# Patient Record
Sex: MALE | Race: WHITE | NOT HISPANIC OR LATINO | Employment: OTHER | URBAN - NONMETROPOLITAN AREA
[De-identification: names, ages, dates, MRNs, and addresses within clinical notes are randomized per-mention and may not be internally consistent; named-entity substitution may affect disease eponyms.]

---

## 2018-04-19 ENCOUNTER — OFFICE VISIT CONVERTED (OUTPATIENT)
Dept: FAMILY MEDICINE CLINIC | Age: 83
End: 2018-04-19
Attending: FAMILY MEDICINE

## 2018-08-24 ENCOUNTER — OFFICE VISIT CONVERTED (OUTPATIENT)
Dept: FAMILY MEDICINE CLINIC | Age: 83
End: 2018-08-24
Attending: NURSE PRACTITIONER

## 2018-09-10 ENCOUNTER — OFFICE VISIT CONVERTED (OUTPATIENT)
Dept: UROLOGY | Facility: CLINIC | Age: 83
End: 2018-09-10
Attending: UROLOGY

## 2018-11-07 ENCOUNTER — OFFICE VISIT CONVERTED (OUTPATIENT)
Dept: FAMILY MEDICINE CLINIC | Age: 83
End: 2018-11-07
Attending: NURSE PRACTITIONER

## 2018-11-16 ENCOUNTER — OFFICE VISIT CONVERTED (OUTPATIENT)
Dept: FAMILY MEDICINE CLINIC | Age: 83
End: 2018-11-16
Attending: NURSE PRACTITIONER

## 2019-05-21 ENCOUNTER — OFFICE VISIT CONVERTED (OUTPATIENT)
Dept: FAMILY MEDICINE CLINIC | Age: 84
End: 2019-05-21
Attending: NURSE PRACTITIONER

## 2019-12-11 ENCOUNTER — OFFICE VISIT CONVERTED (OUTPATIENT)
Dept: FAMILY MEDICINE CLINIC | Age: 84
End: 2019-12-11
Attending: NURSE PRACTITIONER

## 2019-12-11 ENCOUNTER — HOSPITAL ENCOUNTER (OUTPATIENT)
Dept: OTHER | Facility: HOSPITAL | Age: 84
Discharge: HOME | End: 2019-12-11
Attending: NURSE PRACTITIONER

## 2019-12-11 LAB
CHOLEST SERPL-MCNC: 282 MG/DL (ref 107–200)
CHOLEST/HDLC SERPL: 4 {RATIO} (ref 3–6)
HDLC SERPL-MCNC: 70 MG/DL (ref 40–60)
LDLC SERPL CALC-MCNC: 178 MG/DL (ref 70–100)
PSA SERPL-MCNC: 6.73 NG/ML (ref 0–4)
TRIGL SERPL-MCNC: 168 MG/DL (ref 40–150)
VLDLC SERPL-MCNC: 34 MG/DL (ref 5–37)

## 2020-12-22 ENCOUNTER — HOSPITAL ENCOUNTER (OUTPATIENT)
Dept: OTHER | Facility: HOSPITAL | Age: 85
Discharge: HOME | End: 2020-12-22
Attending: NURSE PRACTITIONER

## 2020-12-22 ENCOUNTER — OFFICE VISIT CONVERTED (OUTPATIENT)
Dept: FAMILY MEDICINE CLINIC | Age: 85
End: 2020-12-22
Attending: NURSE PRACTITIONER

## 2020-12-22 LAB
CHOLEST SERPL-MCNC: 273 MG/DL (ref 107–200)
CHOLEST/HDLC SERPL: 4.9 {RATIO} (ref 3–6)
HDLC SERPL-MCNC: 56 MG/DL (ref 40–60)
LDLC SERPL CALC-MCNC: 187 MG/DL (ref 70–100)
T4 FREE SERPL-MCNC: 1.1 NG/DL (ref 0.9–1.8)
TRIGL SERPL-MCNC: 149 MG/DL (ref 40–150)
TSH SERPL-ACNC: 5.01 M[IU]/L (ref 0.27–4.2)
VLDLC SERPL-MCNC: 30 MG/DL (ref 5–37)

## 2021-05-16 VITALS — BODY MASS INDEX: 22.13 KG/M2 | HEIGHT: 67 IN | RESPIRATION RATE: 15 BRPM | WEIGHT: 141 LBS

## 2021-05-18 NOTE — PROGRESS NOTES
Eze Jauregui GHADA 1935     Office/Outpatient Visit    Visit Date: Fri, Aug 24, 2018 02:42 pm    Provider: Yenifer Shafer N.P. (Assistant: Sonya Paz MA)    Location: [facility]        Electronically signed by Yenifer Shafer N.P. on  2018 12:06:20 PM                             SUBJECTIVE:        CC:     Harvey is a 82 year old White male.  presents today due to urinary frequency         HPI:     Patient represents today for  reports of Urinary frequency , worsening over last couple weeks. Getting up on average 7x per night to void. Voiding small amounts, has weak urine stream and has some stopping and starting of urine problems. Currently being treated for Multiple Myeloma with Dr. Solano and normally sees Dr. Powers as his PCP. He denies any  fever or burning with urination, n/v/c or diarrhea.     ROS:     CONSTITUTIONAL:  Negative for chills, fatigue, fever and weight change.      CARDIOVASCULAR:  Negative for chest pain, orthopnea, paroxysmal nocturnal dyspnea and pedal edema.      RESPIRATORY:  Negative for dyspnea and cough.      GASTROINTESTINAL:  Negative for abdominal pain, heartburn, constipation, diarrhea, and stool changes.      GENITOURINARY:  Positive for frequency.   Negative for dysuria or hematuria.      PSYCHIATRIC:  Negative for anxiety and depression.          PM/FM/:     Last Reviewed on 2018 12:05 PM by Yenifer Shafer    Past Medical History:         Positive for    Primary Biliary Cirrhosis;         CURRENT MEDICAL PROVIDERS:    Oncologist: Star Solano             ADVANCED DIRECTIVES: None         Surgical History:     Procedures: colonoscopy , [id number]     Other Surgeries:    skin cancer remove right face;         Family History:     Father:  at age 69; Cause of death was Stroke     Mother:  at age 85; Cause of death was old age     Brother(s): 3 brother(s) total;  Coronary Artery Disease ( CABG ); Hypertension;  Depression      Sister(s): Healthy; 1 sister(s) total; # that are           Social History:     Occupation: Retired (Prior occupation: [employer])     Marital Status:      Children: 5 children         Tobacco/Alcohol/Supplements:     Last Reviewed on 2018 12:05 PM by Yenifer Shafer    Tobacco: He has never smoked.  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         Current Medications:     Last Reviewed on 2018 12:05 PM by Yenifer Shafer    Hydrochlorothiazide (HCTZ) 25mg Tablet Take 1 tablet(s) by mouth daily     Fenofibrate 145mg Tablet Take 1 tablet(s) by mouth daily     Ursodiol 250mg Tablet Take two pill every morning and one pill every evening     Levothyroxine Sodium 0.05mg Tablet 1 tab daily         OBJECTIVE:        Vitals:         Current: 2018 2:51:19 PM    Ht:  5 ft, 8 in;  Wt: 135.8 lbs;  BMI: 20.6    T: 97.7 F (oral);  BP: 124/60 mm Hg (left arm, sitting);  P: 71 bpm (left arm (BP Cuff), sitting);  sCr: 1.62 mg/dL;  GFR: 30.93         Exams:     PHYSICAL EXAM:     GENERAL: Vitals recorded well developed, well nourished;  well groomed;  no apparent distress;     RESPIRATORY: normal respiratory rate and pattern with no distress; normal breath sounds with no rales, rhonchi, wheezes or rubs;     CARDIOVASCULAR: normal rate; rhythm is regular;  normal S1; normal S2; no systolic murmur; no cyanosis; no edema;     GASTROINTESTINAL: nontender, nondistended; no hepatosplenomegaly or masses; no bruits;     GENITOURINARY: prostate: moderate hypertrophy; discreet nodule palpated in left lobe;     NEUROLOGICAL:  cranial nerves, motor and sensory function, reflexes, gait and coordination are all intact;     PSYCHIATRIC:  appropriate affect and demeanor; normal speech pattern; grossly normal memory;         Lab/Test Results: 8/21/18 labs from [facility]see labs, Creat 1.2, Bun 25, eGFR 58, BUN/Creat ratio 20.0/dmt             Glucose, Urine:  Neg (08/24/2018),     Bilirubin, urine:  Negative (08/24/2018),     Ketones, Urine Strip:  Negative (08/24/2018),     Specific Gravity, urine:  1.020 (08/24/2018),     Blood in Urine:  negative (08/24/2018),     pH, urine:  6.0 (08/24/2018),     Protein Urine QL:  negative (08/24/2018),     Urobilinogen, urine:  0.2 E.U./dL (08/24/2018),     Nitrite, Urine:  Negative (08/24/2018),     Leukoctyes, urine:  Negative (08/24/2018),     Appearance:  Clear (08/24/2018),     collection source:  Clean-catch (08/24/2018),     Color:  Yellow (08/24/2018),     Performed by::  AS (08/24/2018),             ASSESSMENT:           601.9   N41.0  Prostatitis              DDx:     V76.44   Z12.5  Screening for prostate cancer              DDx:         ORDERS:         Meds Prescribed:       Cipro (Ciprofloxacin HCl) 500mg Tablet Take 1 tablet(s) by mouth q12h for 14 days  #28 (Twenty Eight) tablet(s) Refills: 0         Lab Orders:       [id number]  Urinalysis, automated, without microscopy  (In-House)         *  [employer] Medicare screening PSA   (Send-Out)           Procedures Ordered:       REFER  Referral to Specialist or Other Facility  (Send-Out)                   PLAN:          Prostatitis Call if no improvement in 1 week to 10 days or worsening of symptoms.         REFERRALS:  Referral initiated to a urologist ( Dr. Shakeel Paz - [facility]; for evaluation of urinary frequency, enlarged prostate with small prostate nodule ).            Prescriptions:       Cipro (Ciprofloxacin HCl) 500mg Tablet Take 1 tablet(s) by mouth q12h for 14 days  #28 (Twenty Eight) tablet(s) Refills: 0           Orders:       [id number]  Urinalysis, automated, without microscopy  (In-House)         REFER  Referral to Specialist or Other Facility  (Send-Out)            Screening for prostate cancer     LABORATORY:  Labs ordered to be performed today include PSA Screening Medicare patients.            Orders:       *  [employer] Medicare screening PSA  (Send-Out)               CHARGE CAPTURE:           Primary Diagnosis:     601.9 Prostatitis            N41.0    Acute prostatitis              Orders:          [id number]   Office/outpatient visit; established patient, level 3  (In-House)             90021   Urinalysis, automated, without microscopy  (In-House)           V76.44 Screening for prostate cancer            Z12.5    Encounter for screening for malignant neoplasm of prostate        ADDENDUMS:      ____________________________________    Date: 08/27/2018 03:55 PM    Author: Idania Roque         Visit Note Faxed to:        User Entered Recipient; Number [phone]

## 2021-05-18 NOTE — PROGRESS NOTES
Eze JaureguiStaci 1935     Office/Outpatient Visit    Visit Date: Thu, Apr 19, 2018 12:34 pm    Provider: Karsten Powers MD (Assistant: Katy Clark RN)    Location: [facility]        Electronically signed by Karsten Powers MD on  04/22/2018 03:41:52 PM                             SUBJECTIVE:        CC:     Harvey is a 82 year old White male.  This is a follow-up visit.  9 month check up (PT IS NOT TAKING URSODIOL)         HPI:         Harvey presents with mixed hyperlipidemia.  Compliance with treatment has been good.  He specifically denies associated symptoms, including muscle pain and weakness.          Hypertension details; Harvey does not check his blood pressure other than at his clinic appointments.  He is tolerating the medication well without side effects.  Compliance with treatment has been good.      He tells me that he has been off of Ursodiol for 3 months.  Says that he was feeling so good that he just didn't see that need to take it.  I explained the preventive nature of the medication, but he really wants to hold off on the med.  He agrees that if the labs start to show an elevation of LFT's again he would get back on the medication at that point.     We reviewed the last note from Dr Solano and the labs related to his MGUS.  He is doing fine in that regard too.         With regard to the hypothyroidism, he denies any related symptoms.  He reports no symptoms suggestive of adverse medication effect.      ROS:     CONSTITUTIONAL:  Negative for chills, fatigue, fever and weight change.      CARDIOVASCULAR:  Negative for chest pain, orthopnea, paroxysmal nocturnal dyspnea and pedal edema.      RESPIRATORY:  Negative for dyspnea and cough.      GASTROINTESTINAL:  Negative for abdominal pain, heartburn, constipation, diarrhea, and stool changes.      GENITOURINARY:  Negative for dysuria and polyuria.      PSYCHIATRIC:  Negative for anxiety and depression.          PMH/FMH/SH:     Last  Reviewed on 2017 11:48 AM by Karsten Powers    Past Medical History:         Positive for    Primary Biliary Cirrhosis;         CURRENT MEDICAL PROVIDERS:    Oncologist: Star Solano             ADVANCED DIRECTIVES: None         Surgical History:     Procedures: colonoscopy , [id number]     Other Surgeries:    skin cancer remove right face;         Family History:     Father:  at age 69; Cause of death was Stroke     Mother:  at age 85; Cause of death was old age     Brother(s): 3 brother(s) total;  Coronary Artery Disease ( CABG ); Hypertension;  Depression     Sister(s): Healthy; 1 sister(s) total; # that are           Social History:     Occupation: Retired (Prior occupation: [employer])     Marital Status:      Children: 5 children         Tobacco/Alcohol/Supplements:     Last Reviewed on 2017 09:00 AM by Helen Hernandez    Tobacco: He has never smoked.  Non-drinker         Substance Abuse History:     Last Reviewed on 2017 11:48 AM by Karsten Powers        Mental Health History:     Last Reviewed on 2017 11:48 AM by Karsten Powers        Communicable Diseases (eg STDs):     Last Reviewed on 2017 11:48 AM by Karsten Powers            Allergies:     Last Reviewed on 2018 12:39 PM by Katy Clark      No Known Drug Allergies.         Current Medications:     Last Reviewed on 2018 12:40 PM by Katy Clark    Hydrochlorothiazide (HCTZ) 25mg Tablet Take 1 tablet(s) by mouth daily     Fenofibrate 145mg Tablet Take 1 tablet(s) by mouth daily     Ursodiol 250mg Tablet Take two pill every morning and one pill every evening     Levothyroxine Sodium 0.05mg Tablet 1 tab daily         OBJECTIVE:        Vitals:         Current: 2018 12:39:17 PM    Ht:  5 ft, 8 in;  Wt: 140 lbs;  BMI: 21.3    T: 97.5 F (oral);  BP: 157/70 mm Hg (left arm, sitting);  P: 71 bpm (left arm (BP Cuff), sitting);  sCr: 1.4 mg/dL;  GFR:  36.26        Repeat:     12:42:50 PM     BP:   132/78mm Hg (left arm, sitting)         Exams:     PHYSICAL EXAM:     GENERAL:  well developed and nourished; appropriately groomed; in no apparent distress;     EYES: Nonicteric and with unremarkable lids, iris and pupils;     E/N/T: EARS:  normal external auditory canals and tympanic membranes;  grossly normal hearing; OROPHARYNX: several missing teeth;     NECK: carotid exam reveals no bruits;     RESPIRATORY: normal respiratory rate and pattern with no distress; normal breath sounds with no rales, rhonchi, wheezes or rubs;     CARDIOVASCULAR: normal rate; rhythm is regular;  no systolic murmur;     LYMPHATIC: no enlargement of cervical or facial nodes;     SKIN: The  site on the dorsal aspect of  hand is resolved now, (see photo);     MUSCULOSKELETAL: spine: lumbar lateral curvature with convexity to the right;  No pedal edema.;     NEUROLOGIC: No lateralizing deficit.;     PSYCHIATRIC:  appropriate affect and demeanor; normal speech pattern; grossly normal memory;         ASSESSMENT           272.2   E78.2  Mixed hyperlipidemia              DDx:     401.1   I10  Hypertension              DDx:     571.6   K74.3  Biliary cirrhosis              DDx:     273.1   D47.2  MGUS              DDx:     244.9   E03.9  Hypothyroidism              DDx:         ORDERS:         Meds Prescribed:       Refill of: Hydrochlorothiazide (HCTZ) 25mg Tablet Take 1 tablet(s) by mouth daily  #90 (Ninety) tablet(s) Refills: 0         Lab Orders:       [id number]  HTN - [facility] CMP AND LIPID: [id number], [id number]  (Send-Out)         66147  TSH - [facility] TSH  (Send-Out)                   PLAN:          Mixed hyperlipidemia     LABORATORY:  Labs ordered to be performed today include HTN/Lipid Panel: CMP, Lipid.            Orders:       [id number]  HTN - [facility] CMP AND LIPID: [id number], [id number]  (Send-Out)            Hypertension           Prescriptions:       Refill of: Hydrochlorothiazide (HCTZ) 25mg Tablet Take 1 tablet(s)  by mouth daily  #90 (Ninety) tablet(s) Refills: 0          Biliary cirrhosis will see what the labs look like with him off of the medication.          MGUS continue to follow with Dr Solano          Hypothyroidism     LABORATORY:  Labs ordered to be performed today include TSH.            Orders:       [id number]  TSH - [facility] TSH  (Send-Out)               CHARGE CAPTURE           **Please note: ICD descriptions below are intended for billing purposes only and may not represent clinical diagnoses**        Primary Diagnosis:         272.2 Mixed hyperlipidemia            E78.2    Mixed hyperlipidemia              Orders:          [id number]   Office/outpatient visit; established patient, level 4  (In-House)           401.1 Hypertension            I10    Essential (primary) hypertension    571.6 Biliary cirrhosis            K74.3    Primary biliary cirrhosis    273.1 MGUS            D47.2    Monoclonal gammopathy    244.9 Hypothyroidism            E03.9    Hypothyroidism, unspecified

## 2021-05-18 NOTE — PROGRESS NOTES
Juventino Eze GHADA 1935     Office/Outpatient Visit    Visit Date:  03:46 pm    Provider: Yenifer Shafer N.P. (Assistant: Bashir Hummel)    Location: [facility]        Electronically signed by Yenifer Shafer N.P. on  2018 06:08:11 PM                             SUBJECTIVE:        CC: (not taking the ursidiol)     Harvey is a 83 year old White male.  This is a follow-up visit.  anxiety (wants flu shot today)         HPI:         PHQ-9 Depression Screening: Completed form scanned and in chart; Total Score 4 Alcohol Consumption Screening: Completed form scanned and in chart; Total Score 0         Concerning depression with anxiety, this is a routine follow-up.  The diagnosis of depression was made 3 months ago.  Presently, he feels a mild degree of depression.  Current medications include Lexapro.  Currently, he is doing well without any significant affective symptoms.  Presently, Harvey denies suicidal ideation.  Medical history that may be a contributing factor to depression includes CKD, MM.      ROS:     CONSTITUTIONAL:  Negative for chills, fatigue, fever and weight change.      CARDIOVASCULAR:  Negative for chest pain, orthopnea, paroxysmal nocturnal dyspnea and pedal edema.      RESPIRATORY:  Negative for dyspnea and cough.      GASTROINTESTINAL:  Negative for abdominal pain, heartburn, constipation, diarrhea, and stool changes.      PSYCHIATRIC:  Positive for anxiety and depression ( (improved with Lexapro) ).          PM/[facility]/:     Last Reviewed on 2018 04:11 PM by Yenifer Shafer    Past Medical History:         Positive for    Primary Biliary Cirrhosis;         CURRENT MEDICAL PROVIDERS:    Oncologist: Star Solano             ADVANCED DIRECTIVES: None         Surgical History:     Procedures: colonoscopy , [id number]     Other Surgeries:    skin cancer remove right face;         Family History:     Father:  at age 69; Cause of death was Stroke      Mother:  at age 85; Cause of death was old age     Brother(s): 3 brother(s) total;  Coronary Artery Disease ( CABG ); Hypertension;  Depression     Sister(s): Healthy; 1 sister(s) total; # that are           Social History:     Occupation: Retired (Prior occupation: [employer])     Marital Status:      Children: 5 children         Tobacco/Alcohol/Supplements:     Last Reviewed on 2018 04:11 PM by Yenifer Shafer    Tobacco: He has never smoked.  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         Current Medications:     Last Reviewed on 2018 04:11 PM by Yenifer Shafer    Hydrochlorothiazide (HCTZ) 25mg Tablet Take 1 tablet(s) by mouth daily     Fenofibrate 145mg Tablet Take 1 tablet(s) by mouth daily     Ursodiol 250mg Tablet Take two pill every morning and one pill every evening     Levothyroxine Sodium 0.05mg Tablet 1 tab daily     Dexamethasone 4mg Tablet Take  1 tablet(s) by mouth qam     Acyclovir 400mg Tablet 5 pills tues and wed morning         OBJECTIVE:        Vitals:         Current: 11/7/2018 3:56:26 PM    Ht:  5 ft, 8 in;  Wt: 134.2 lbs;  BMI: 20.4    T: 97.3 F (oral);  BP: 140/66 mm Hg (right arm, sitting);  P: 66 bpm (right arm (BP Cuff), sitting);  sCr: 1.62 mg/dL;  GFR: 30.28        Exams:     PHYSICAL EXAM:     GENERAL: Vitals recorded well developed, well nourished;  well groomed;  no apparent distress;     RESPIRATORY: normal respiratory rate and pattern with no distress; normal breath sounds with no rales, rhonchi, wheezes or rubs;     CARDIOVASCULAR: normal rate; rhythm is regular;  normal S1; normal S2; no systolic murmur; no cyanosis; no edema;     GASTROINTESTINAL: nontender, nondistended; no hepatosplenomegaly or masses; no bruits;     NEUROLOGIC: GROSSLY INTACT     PSYCHIATRIC:  appropriate affect and demeanor; normal speech pattern; grossly normal memory;         Procedures:     Vaccination against other viral diseases, Influenza     1. Influenza high dose 0.5 ml unit dose, ABN signed given IM in the left upper arm; administered by  Regarding contraindications to an Influenza vaccine: Denies moderate/severe illness with/without fever; serious reaction to eggs, egg proteins, gentamicin, gelatin, arginine, neomycin or polymixin; serious reaction after recieving previous influenza vaccines; and history of Guillain-[place] Syndrome.              ASSESSMENT:           V04.81   Z23  Vaccination against other viral diseases, Influenza              DDx:     V79.0   Z13.89  Screening for depression              DDx:     300.4   F34.9  Depression with anxiety              DDx:         ORDERS:         Meds Prescribed:       Refill of: Lexapro (Escitalopram Oxalate) 5mg Tablet 1 tablet daily at bedtime  #90 (Ninety) tablet(s) Refills: 1         Lab Orders:       APPTO  Appointment need  (In-House)           Procedures Ordered:       [id number]  Fluzone High Dose   (In-House)           Other Orders:         Medicare Flu Administration  (In-House)                   PLAN:          Vaccination against other viral diseases, Influenza           Orders:       [id number]  Fluzone High Dose  (In-House)           Medicare Flu Administration  (In-House)            Screening for depression     MIPS PHQ-9 Depression Screening: Completed form scanned and in chart; Total Score 4          Depression with anxiety         FOLLOW-UP: Schedule a follow-up visit in 4 months..            Prescriptions:       Refill of: Lexapro (Escitalopram Oxalate) 5mg Tablet 1 tablet daily at bedtime  #90 (Ninety) tablet(s) Refills: 1           Orders:       APPTO  Appointment need  (In-House)               Patient Recommendations:        For  Depression with anxiety:     Schedule a follow-up visit in 4 months.                APPOINTMENT INFORMATION:        Monday Tuesday Wednesday Thursday Friday Saturday Sunday            Time:___________________AM  PM   Date:_____________________             CHARGE CAPTURE:           Primary Diagnosis:     V04.81 Vaccination against other viral diseases, Influenza            Z23    Encounter for immunization              Orders:          [id number]   Office/outpatient visit; established patient, level 4  (In-House)             91188   Fluzone High Dose  (In-House)                Medicare Flu Administration  (In-House)           V79.0 Screening for depression            Z13.89    Encounter for screening for other disorder    300.4 Depression with anxiety            F34.9    Persistent mood [affective] disorder, unspecified              Orders:          APPTO   Appointment need  (In-House)

## 2021-05-18 NOTE — PROGRESS NOTES
Eze JaureguiStaci 1935     Office/Outpatient Visit    Visit Date: Tue, May 21, 2019 09:03 am    Provider: Yenifer Shafer N.P. (Assistant: Sonya Paz MA)    Location: [facility]        Electronically signed by Yenifer Shafer N.P. on  05/21/2019 12:52:15 PM                             SUBJECTIVE:        CC:     Harvey is a 83 year old White male.  This is a follow-up visit.  med refill         HPI:         Patient presents with hypothyroidism.  This was first diagnosed about 4 yrs ago, was started on LT4 by Dr. Solano years ago.  He is currently taking Levothyroid, 50 mcg daily.  TSH was last checked one month ago.  The result was reported as normal.  He denies any related symptoms.  He reports no symptoms suggestive of adverse medication effect.  Had labs done at DR. Major office, will obtain copy     ROS:     CONSTITUTIONAL:  Negative for chills, fatigue and fever.      CARDIOVASCULAR:  Positive for takes BP and Chol med as directed..   Negative for chest pain, orthopnea, paroxysmal nocturnal dyspnea or pedal edema.      RESPIRATORY:  Negative for dyspnea and cough.      GASTROINTESTINAL:  Negative for abdominal pain, heartburn, constipation, diarrhea, and stool changes.      ENDOCRINE:  Positive for Takes Thyroid medication as directed.      PSYCHIATRIC:  Positive for anxiety ( (taking Lexapro with improvement at 5mg but still having symptoms of depression, dealing with wife who has Alz) ), depression and feelings of stress.   Negative for suicidal thoughts.          PMH/FM/SH:     Last Reviewed on 5/21/2019 09:25 AM by Yenifer Shafer    Past Medical History:         Positive for    Primary Biliary Cirrhosis;         CURRENT MEDICAL PROVIDERS:    Oncologist: Star Solano             ADVANCED DIRECTIVES: None         PREVENTIVE HEALTH MAINTENANCE             COLORECTAL CANCER SCREENING: Up to date (colonoscopy q10y; sigmoidoscopy q5y; Cologuard q3y) was last done 8-2012, Results  are in chart; colonoscopy with normal results     PSA: was last done 18         Surgical History:     Procedures: colonoscopy , [id number]     Other Surgeries:    skin cancer remove right face;         Family History:     Father:  at age 69; Cause of death was Stroke     Mother:  at age 85; Cause of death was old age     Brother(s): 3 brother(s) total;  Coronary Artery Disease ( CABG ); Hypertension;  Depression     Sister(s): Healthy; 1 sister(s) total; # that are           Social History:     Occupation: Retired (Prior occupation: [employer])     Marital Status:      Children: 5 children         Tobacco/Alcohol/Supplements:     Last Reviewed on 2019 09:25 AM by Yenifer Shafer    Tobacco: He has never smoked.  Non-drinker         Mental Health History:     Reviewed.             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         Current Medications:     Last Reviewed on 2019 09:25 AM by Yenifer Shafer    Lexapro 5mg Tablet 1  tablet daily at bedtime     Fenofibrate 145mg Tablet Take 1 tablet(s) by mouth daily     Hydrochlorothiazide (HCTZ) 25mg Tablet Take 1 tablet(s) by mouth daily     Levothyroxine Sodium 0.05mg Tablet 1 tab daily         OBJECTIVE:        Vitals:         Current: 5/21/2019 9:08:45 AM    Ht:  5 ft, 8 in;  Wt: 134.8 lbs;  BMI: 20.5    T: 97.3 F (oral);  BP: 131/64 mm Hg (left arm, sitting);  P: 67 bpm (left arm (BP Cuff), sitting);  sCr: 1.62 mg/dL;  GFR: 30.34        Exams:     PHYSICAL EXAM:     GENERAL: Vitals recorded well developed, well nourished;  well groomed;  no apparent distress;     NECK: thyroid exam reveals enlargement;  carotid exam is normal with good upstroke and no bruits;     RESPIRATORY: normal respiratory rate and pattern with no distress; normal breath sounds with no rales, rhonchi, wheezes or rubs;     CARDIOVASCULAR: normal rate; rhythm is regular;  normal S1; normal S2; no systolic murmur; no cyanosis; no edema;     GASTROINTESTINAL: nontender, nondistended; no hepatosplenomegaly or masses; no bruits;     NEUROLOGIC: GROSSLY INTACT     PSYCHIATRIC:  appropriate affect and demeanor; normal speech pattern; grossly normal memory;         ASSESSMENT:           244.9   E03.9  Hypothyroidism              DDx:     272.2   E78.2  Mixed hyperlipidemia              DDx:     401.1   I10  Hypertension              DDx:     300.4   F34.9  Depression with anxiety              DDx:         ORDERS:         Meds Prescribed:       Refill of: Lexapro (Escitalopram Oxalate) 10mg Tablet 1 po q HS  #90 (Ninety) tablet(s) Refills: 0       Refill of: Fenofibrate 145mg Tablet Take 1 tablet(s) by mouth daily  #90 (Ninety) tablet(s) Refills: 0       Refill of: Hydrochlorothiazide (HCTZ) 25mg Tablet Take 1 tablet(s) by mouth daily  #90 (Ninety) tablet(s) Refills: 0       Refill of: Levothyroxine Sodium 0.05mg Tablet 1 tab daily  #90 (Ninety) tablet(s) Refills: 0                 PLAN:          Hypothyroidism          FOLLOW-UP:.   for 11/2019 for Medicare Wellness Visit           Prescriptions:       Refill of: Levothyroxine Sodium 0.05mg Tablet 1 tab daily  #90 (Ninety) tablet(s) Refills: 0          Mixed hyperlipidemia           Prescriptions:       Refill of: Fenofibrate 145mg Tablet Take 1 tablet(s) by mouth daily  #90 (Ninety) tablet(s) Refills: 0          Hypertension           Prescriptions:       Refill of: Hydrochlorothiazide (HCTZ) 25mg Tablet Take 1 tablet(s) by mouth daily  #90 (Ninety) tablet(s) Refills: 0          Depression with anxiety           Prescriptions:       Refill of: Lexapro (Escitalopram Oxalate) 10mg Tablet 1 po q HS  #90 (Ninety) tablet(s) Refills: 0             Patient Recommendations:        For  Hypothyroidism:                     APPOINTMENT INFORMATION:        Monday Tuesday Wednesday Thursday Friday Saturday Sunday            Time:___________________AM  PM   Date:_____________________             CHARGE CAPTURE:           Primary Diagnosis:     244.9 Hypothyroidism            E03.9    Hypothyroidism, unspecified              Orders:          34225   Office/outpatient visit; established patient, level 3  (In-House)           272.2 Mixed hyperlipidemia            E78.2    Mixed hyperlipidemia    401.1 Hypertension            I10    Essential (primary) hypertension    300.4 Depression with anxiety            F34.9    Persistent mood [affective] disorder, unspecified

## 2021-05-18 NOTE — PROGRESS NOTES
Eze Jauregui  1935     Office/Outpatient Visit    Visit Date: Tue, Dec 22, 2020 11:26 am    Provider: Yenifer Shafer N.P. (Assistant: Monisha Vasquez, )    Location: [facility]        Electronically signed by Yenifer Shafer N.P. on  12/23/2020 10:37:21 PM                             Subjective:        CC: Harvey is a 85 year old White male.  Medicare Wellness         HPI:           Harvey is here for a Medicare wellness visit.  The required HRA questions are integrated within this visit note. Family medical history and individual medical/surgical history were reviewed and updated.  A current height, weight, BMI, blood pressure, and pulse were recorded in the vitals section of the note and have been reviewed. Patient's medications, including supplements, were recorded in the chart and reviewed.          Self-Assessment of Health: He rates his health as good. He rates his confidence of being able to control/manage most of his health problems as somewhat confident. His physical/emotional health has limited his social activites moderately.  A review of cognitive impairment was performed, including ability to drive a car, manage finances, and any memory changes, and was found to be negative.  A review of functional ability, including bathing, dressing, walking, and urine/bowel continence as well as level of safety was performed and was found to be negative.  Falls Risk: Has not had any falls or only one fall without injury in the past year.  In regard to hearing, he reports having trouble hearing the TV/radio when others do not, having to strain to hear or understand conversations and wearing hearing aid(s).          Immunization Status: Up to date; Physical Activity: He never excercises.; Type of diet patient normally eats is described as well-balanced with fruits and vegetables Tobacco: He has never smoked.   Preventative Health updated today.            PHQ-9 Depression Screening:  Completed form scanned and in chart; Total Score 8     ROS:     CONSTITUTIONAL:  Positive for restared on Chemo montly by Dr Solano.      CARDIOVASCULAR:  Negative for chest pain, palpitations, tachycardia, orthopnea, and edema.      RESPIRATORY:  Negative for recent cough, dyspnea and frequent wheezing.      GASTROINTESTINAL:  Negative for abdominal pain, constipation, diarrhea, nausea and vomiting.      NEUROLOGICAL:  Negative for dizziness and weakness.      PSYCHIATRIC:  Positive for anxiety and depression ( (lost wife recently, doing better each day) ).   Negative for suicidal thoughts.          Past Medical History / Family History / Social History:         Last Reviewed on 2020 11:52 AM by Yenifer Shafer    Past Medical History:         Positive for    Primary Biliary Cirrhosis;         CURRENT MEDICAL PROVIDERS:    Oncologist: Star Solano             ADVANCED DIRECTIVES: None         PREVENTIVE HEALTH MAINTENANCE             COLORECTAL CANCER SCREENING: Up to date (colonoscopy q10y; sigmoidoscopy q5y; Cologuard q3y) was last done , Results are in chart; colonoscopy with normal results     PSA: was last done 19 no longer indicated         Surgical History:     Procedures: colonoscopy , [id number]     Other Surgeries:    skin cancer remove right face;         Family History:     Father:  at age 69; Cause of death was Stroke     Mother:  at age 85; Cause of death was old age     Brother(s): 3 brother(s) total;  Coronary Artery Disease ( CABG ); Hypertension;  Depression     Sister(s): Healthy; 1 sister(s) total; # that are           Social History:     Occupation: Retired (Prior occupation: [employer])     Marital Status:      Children: 5 children         Tobacco/Alcohol/Supplements:     Last Reviewed on 2020 11:52 AM by Yenifer Shafer    Tobacco: He has never smoked.  Non-drinker         Substance Abuse History:     Last Reviewed on 2020  11:52 AM by Yenifer Shafer        Mental Health History:     Last Reviewed on 12/22/2020 11:52 AM by Yenifer Shafer        Communicable Diseases (eg STDs):     Last Reviewed on 12/22/2020 11:52 AM by Yenifer Shafer        Current Problems:     Last Reviewed on 12/22/2020 11:52 AM by Yenifer Shafer    Primary biliary cirrhosis    Mixed hyperlipidemia    Chronic kidney disease, stage 2 (mild)    Multiple myeloma not having achieved remission    Hypothyroidism, unspecified    Essential (primary) hypertension    Basal cell carcinoma of skin of unspecified parts of face    Monoclonal gammopathy    Actinic keratosis    Persistent mood [affective] disorder, unspecified    Other long term (current) drug therapy    Encounter for general adult medical examination without abnormal findings    Encounter for screening for depression        Immunizations:     Influenza, high dose seasonal (FLUZONE HIGH-DOSE 5422-4533) 12/11/2019    influenza, high-dose, quadrivalent (FLUZONE HIGH-DOSE QUAD 2020-21) 10/23/2020    Prevnar 13 (Pneumococcal PCV 13) 2/18/2015    Flulaval 11/1/2010    Fluzone (3 + years dose) 10/30/2009    Fluzone (3 + years dose) 11/15/2011    Fluzone pf (3+ years dose) 11/13/2013    Pneomovax 8/25/2009    Fluzone High-Dose pf (>=65 yr) 10/16/2014    Fluzone High-Dose pf (>=65 yr) 11/2/2015    Fluzone High-Dose pf (>=65 yr) 1/27/2017    Fluzone High-Dose pf (>=65 yr) 11/7/2018        Allergies:     Last Reviewed on 12/22/2020 11:52 AM by Yenifer Shafer    No Known Allergies.        Current Medications:     Last Reviewed on 12/22/2020 11:52 AM by Yenifer Shafer    Chemo     levothyroxine 50 mcg oral tablet [TAKE 1 TABLET BY MOUTH EVERY DAY]    escitalopram oxalate 10 mg oral tablet [TAKE 1 TABLET BY MOUTH EACH NIGHT AT BEDTIME   ]    LORazepam 0.5 mg oral tablet [take 1/2 tablet at bedtime prn for  anxiety , take sparingly can cause dizziness ]    DEXAMETHASON 4MG Tablets [4 MG DAILY X2 DAYS -  4MG BY MOUTH EVERY DAY X2 DAYS - START DAY AFTER EACH DARZALEX INFUSION]        Objective:        Vitals:         Current: 12/22/2020 11:34:55 AM    Ht:  5 ft, 8 in;  Wt: 132.4 lbs;  BMI: 20.1T: 97.3 F (temporal);  BP: 147/60 mm Hg (right arm, sitting);  P: 68 bpm (right arm (BP Cuff), sitting);  sCr: 1.62 mg/dL;  GFR: 29.13        Exams:     PHYSICAL EXAM:     GENERAL: Vitals recorded well developed, thin;  well groomed;  no apparent distress;     RESPIRATORY: normal respiratory rate and pattern with no distress; normal breath sounds with no rales, rhonchi, wheezes or rubs;     CARDIOVASCULAR: normal rate; rhythm is regular;  no systolic murmur; no edema;     GASTROINTESTINAL: nontender, nondistended; no hepatosplenomegaly or masses; no bruits;     NEUROLOGIC: GROSSLY INTACT     PSYCHIATRIC:  appropriate affect and demeanor; normal speech pattern; grossly normal memory;         Assessment:         Z00.00   Encounter for general adult medical examination without abnormal findings       Z13.31   Encounter for screening for depression       E78.2   Mixed hyperlipidemia       E03.9   Hypothyroidism, unspecified           ORDERS:         Radiology/Test Orders:       3017F  Colorectal CA screen results documented and reviewed (PV)  (In-House)              Lab Orders:       [id number]  LPDP - [facility] Lipid Panel  (Send-Out)            16195  TSH - [facility] TSH  (Send-Out)            APPTO  Appointment need  (In-House)              Procedures Ordered:         Annual wellness visit, includes a PPPS, subsequent visit  (In-House)              Other Orders:       1101F  Pt screen for fall risk; document no falls in past year or only 1 fall w/o injury in past year (ANISH)  (In-House)              Depression screen positive and follow up plan documented  (In-House)                      Plan:         Encounter for general adult medical examination without abnormal findings    MIPS Has had no falls or only one fall without injury in the  past year Vaccines Flu and Pneumonia updated in Shot record Colorectal Cancer Screening is up to date and the results are in the chart           Orders:       1101F  Pt screen for fall risk; document no falls in past year or only 1 fall w/o injury in past year (ANISH)  (In-House)            3017F  Colorectal CA screen results documented and reviewed (PV)  (In-House)              Annual wellness visit, includes a PPPS, subsequent visit  (In-House)              Encounter for screening for depression    MIPS PHQ-9 Depression Screening: Completed form scanned and in chart; Total Score 8 Positive Depression Screen: Stable on medications. No suicidal ideation.            Orders:         Depression screen positive and follow up plan documented  (In-House)              Mixed hyperlipidemia    LABORATORY:  Labs ordered to be performed today include lipid panel.      FOLLOW-UP: Schedule a follow-up visit in 6 months.:.:for UDS in 3 mo           Orders:       [id number]  [facility] Lipid Panel  (Send-Out)            APPTO  Appointment need  (In-House)              Hypothyroidism, unspecified    LABORATORY:  Labs ordered to be performed today include TSH.            Orders:       05757  TSH - [facility] TSH  (Send-Out)                  Patient Recommendations:        For  Mixed hyperlipidemia:    Schedule a follow-up visit in 6 months.                APPOINTMENT INFORMATION:        Monday Tuesday Wednesday Thursday Friday Saturday Sunday            Time:___________________AM  PM   Date:_____________________             Charge Capture:         Primary Diagnosis:     Z00.00  Encounter for general adult medical examination without abnormal findings           Orders:      98531  Preventive medicine, established patient, age 65+ years  (In-House)            1101F  Pt screen for fall risk; document no falls in past year or only 1 fall w/o injury in past year (ANISH)  (In-House)            3017F  Colorectal CA screen results  documented and reviewed (PV)  (In-House)              Annual wellness visit, includes a PPPS, subsequent visit  (In-House)              Z13.31  Encounter for screening for depression           Orders:        Depression screen positive and follow up plan documented  (In-House)              E78.2  Mixed hyperlipidemia           Orders:      APPTO  Appointment need  (In-House)              E03.9  Hypothyroidism, unspecified

## 2021-05-18 NOTE — PROGRESS NOTES
Eze JaureguiStaci 1935     Office/Outpatient Visit    Visit Date: Fri, Nov 16, 2018 01:39 pm    Provider: Yenifer Shafer N.P. (Assistant: Sonya Paz MA)    Location: [facility]        Electronically signed by Yenifer Shafer N.P. on  11/16/2018 04:40:30 PM                             SUBJECTIVE:        CC:     Harvey is a 83 year old White male.  medicare wellness         HPI:         Harvey is here for a Medicare wellness visit.  ADVANCE DIRECTIVES (Please update in PMH): Durable Power of  on file Returning to health checkup, the required HRA questions are integrated within this visit note. Family medical history and individual medical/surgical history were reviewed and updated.  A current height, weight, BMI, blood pressure, and pulse were recorded in the vitals section of the note and have been reviewed. Patient's medications, including supplements, were recorded in the chart and reviewed.  Current providers and suppliers were reviewed and updated.          Self-Assessment of Health: He rates his health as very good. He rates his confidence of being able to control/manage most of his health problems as very confident. His physical/emotional health has limited his social activites not at all.  A review of cognitive impairment was performed, including ability to drive a car, manage finances, and any memory changes, and was found to be negative.  A review of functional ability, including bathing, dressing, walking, and urine/bowel continence as well as level of safety was performed and was found to be negative.  Falls Risk: Has fallen 2 or more times or had one fall with injury in the past year.  In regard to hearing, he reports having trouble hearing the TV/radio when others do not and having to strain to hear or understand conversations, but not wearing hearing aid(s).  Concerning home safety, he reports that at home he DOES have adequate lighting, a skid resistant shower/tub,  handrails on stairs and functioning smoke alarms, but not grab bars in the bath or absence of throw rugs.          Immunization Status: Age>60, no shingles vaccination; pt unsure of last tdap; Physical Activity: He never excercises.; Type of diet patient normally eats is described as well-balanced with fruits and vegetables Tobacco: He has never smoked.  Preventative Health updated today.          PHQ-9 Depression Screening: Completed form scanned and in chart; Total Score 12     ROS:     CONSTITUTIONAL:  Negative for chills, fatigue, fever and weight change.      E/N/T:  Negative for hearing problems, E/N/T pain, congestion, rhinorrhea, epistaxis, hoarseness, and dental problems.      CARDIOVASCULAR:  Negative for chest pain, orthopnea, paroxysmal nocturnal dyspnea and pedal edema.      RESPIRATORY:  Negative for dyspnea and cough.      GASTROINTESTINAL:  Negative for abdominal pain, heartburn, constipation, diarrhea, and stool changes.      NEUROLOGICAL:  Negative for memory loss, paresthesias and seizures.      PSYCHIATRIC:  Negative for anxiety and depression.          PMH/FMH/SH:     Last Reviewed on 2018 02:07 PM by Yenifer Shafer    Past Medical History:         Positive for    Primary Biliary Cirrhosis;         CURRENT MEDICAL PROVIDERS:    Oncologist: Star Solano             ADVANCED DIRECTIVES: None         PREVENTIVE HEALTH MAINTENANCE             COLORECTAL CANCER SCREENING: Up to date (colonoscopy q10y; sigmoidoscopy q5y; Cologuard q3y) was last done , Results are in chart; colonoscopy with normal results     PSA: was last done 18         Surgical History:     Procedures: colonoscopy , [id number]     Other Surgeries:    skin cancer remove right face;         Family History:     Father:  at age 69; Cause of death was Stroke     Mother:  at age 85; Cause of death was old age     Brother(s): 3 brother(s) total;  Coronary Artery Disease ( CABG ); Hypertension;  Depression      Sister(s): Healthy; 1 sister(s) total; # that are           Social History:     Occupation: Retired (Prior occupation: [employer])     Marital Status:      Children: 5 children         Tobacco/Alcohol/Supplements:     Last Reviewed on 2018 02:07 PM by Yenifer Shafer    Tobacco: He has never smoked.  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         Current Medications:     Last Reviewed on 2018 02:07 PM by Yenifer Shafer    Lexapro 5mg Tablet 1 tablet daily at bedtime     Hydrochlorothiazide (HCTZ) 25mg Tablet Take 1 tablet(s) by mouth daily     Fenofibrate 145mg Tablet Take 1 tablet(s) by mouth daily     Ursodiol 250mg Tablet Take two pill every morning and one pill every evening     Levothyroxine Sodium 0.05mg Tablet 1 tab daily     Acyclovir 400mg Tablet 5 pills tu and wed morning     Dexamethasone 4mg Tablet Take 1 tablet(s) by mouth qam         OBJECTIVE:        Vitals:          Current: 11/16/2018 1:53:37 PM    Ht:  5 ft, 8 in;  Wt: 134.4 lbs;  BMI: 20.4    T: 97.3 F (oral);  BP: 130/62 mm Hg (left arm, sitting);  P: 71 bpm (left arm (BP Cuff), sitting);  sCr: 1.62 mg/dL;  GFR: 30.30    VA: 20/40 OD, 20/40 OS (near, with correction)        Exams:     PHYSICAL EXAM:     GENERAL: Vitals recorded well developed, well nourished;  well groomed;  no apparent distress;     E/N/T:  normal EACs, TMs, nasal/oral mucosa, teeth, gingiva, and oropharynx;     RESPIRATORY: normal respiratory rate and pattern with no distress; normal breath sounds with no rales, rhonchi, wheezes or rubs;     CARDIOVASCULAR: normal rate; rhythm is regular;  normal S1; normal S2; no systolic murmur; no cyanosis; no edema;     GASTROINTESTINAL: nontender, nondistended; no hepatosplenomegaly or masses; no bruits;     SKIN:  no significant rashes or lesions; no suspicious moles;     MUSCULOSKELETAL:  Normal range of motion, strength and tone;     NEUROLOGIC: GROSSLY INTACT     PSYCHIATRIC:  appropriate affect and demeanor; normal speech pattern; grossly normal memory;         ASSESSMENT:           V70.0   Z00.00  Health checkup              DDx:     V79.0   Z13.89  Screening for depression              DDx:         ORDERS:         Lab Orders:       APPTO  Appointment need  (In-House)           Procedures Ordered:         Annual wellness visit, includes a PPPS, subsequent visit  (In-House)           Other Orders:       1100F  Pt screen for fall risk; document 2+ falls in the past yr or any fall w/injury in past year (ANISH)  (In-House)           Negative EtOH screen  (In-House)           Depression Screen Annual Medicare  (In-House)           Depression screen positive and follow up plan documented  (In-House)                   PLAN:          Health checkup Hand written script given for Quad cane, also discussed hearing aids, will ck with Medicare to see what they will cover . dmt     MIPS Has fallen 2+ times  in the past year or had one fall with an injury in the past year Vaccines Flu and Pneumonia updated in Shot record Negative alcohol screen     FOLLOW-UP: Schedule a follow-up visit in 3 months. in 1 year.            Orders:       1100F  Pt screen for fall risk; document 2+ falls in the past yr or any fall w/injury in past year (ANISH)  (In-House)           Negative EtOH screen  (In-House)         APPTO  Appointment need  (In-House)           Annual wellness visit, includes a PPPS, subsequent visit  (In-House)            Screening for depression     MIPS PHQ-9 Depression Screening: Completed form scanned and in chart; Total Score 12 currently being treated with Lexapro . dmt           Orders:         Depression Screen Annual Medicare  (In-House)           Depression screen positive and follow up plan documented  (In-House)               Patient Recommendations:        For  Health checkup:     Schedule a follow-up visit in 3 months.                APPOINTMENT INFORMATION:        Monday Tuesday Wednesday Thursday Friday Saturday Sunday            Time:___________________AM  PM   Date:_____________________             CHARGE CAPTURE:           Primary Diagnosis:     V70.0 Health checkup            Z00.00    Encounter for general adult medical examination without abnormal findings              Orders:          94310   Preventive medicine, established patient, age 65+ years  (In-House)             1100F   Pt screen for fall risk; document 2+ falls in the past yr or any fall w/injury in past year (ANISH)  (In-House)                Negative EtOH screen  (In-House)             APPTO   Appointment need  (In-House)                Annual wellness visit, includes a PPPS, subsequent visit  (In-House)           V79.0 Screening for depression            Z13.89    Encounter for screening for other disorder              Orders:             Depression Screen Annual Medicare  (In-House)                 Depression screen positive and follow up plan documented  (In-House)

## 2021-05-18 NOTE — PROGRESS NOTES
Eze Jauregui  1935     Office/Outpatient Visit    Visit Date: Wed, Dec 11, 2019 10:27 am    Provider: Yenifer Shafer N.P. (Assistant: Ellyn Byrd MA)    Location: [facility]        Electronically signed by Yenifer Shafer N.P. on  12/11/2019 11:19:22 AM                             Subjective:        CC: Harvey is a 84 year old White male.  Patient presents today for [facility] visit         HPI:           Harvey is here for a Medicare wellness visit.  The required HRA questions are integrated within this visit note. Family medical history and individual medical/surgical history were reviewed and updated.  A current height, weight, BMI, blood pressure, and pulse were recorded in the vitals section of the note and have been reviewed. Patient's medications, including supplements, were recorded in the chart and reviewed.  Current providers and suppliers were reviewed and updated.          Self-Assessment of Health: He rates his health as very good. He rates his confidence of being able to control/manage most of his health problems as very confident. His physical/emotional health has limited his social activites moderately.  A review of cognitive impairment was performed, including ability to drive a car, manage finances, and any memory changes, and was found to be negative.  A review of functional ability, including bathing, dressing, walking, and urine/bowel continence as well as level of safety was performed and was found to be negative.  Falls Risk: Has not had any falls or only one fall without injury in the past year.  In regard to hearing, he reports wearing hearing aid(s), but not having trouble hearing the TV/radio when others do not or having to strain to hear or understand conversations.  Concerning home safety, he reports that at home he DOES have adequate lighting, a skid resistant shower/tub, grab bars in the bath, handrails on stairs and functioning smoke alarms, but not absence  of throw rugs.          Immunization Status: ** >10 years since last Td booster; ** Has not received influenza vaccine for this season; Age>60, no shingles vaccination; Physical Activity: He never excercises.; Type of diet patient normally eats is described as well-balanced with fruits and vegetables Tobacco: He has never smoked.  Preventative Health updated today.            PHQ-9 Depression Screening: Completed form scanned and in chart; Total Score 6     ROS:     CONSTITUTIONAL:  Negative for chills, fatigue, fever, and weight change.      CARDIOVASCULAR:  Negative for chest pain, palpitations, tachycardia, orthopnea, and edema.      RESPIRATORY:  Negative for recent cough, dyspnea and frequent wheezing.      GASTROINTESTINAL:  Positive for Hx Biliary Cirrhosis , was taking Ursiodiol but was stopped by Dr. Solano, was told he no longer needed it.   Negative for abdominal pain, constipation, diarrhea, nausea or vomiting.      GENITOURINARY:  Negative for dysuria and hematuria.      NEUROLOGICAL:  Negative for dizziness, memory loss, paresthesias, tremor and weakness.      HEMATOLOGIC/LYMPHATIC:  Positive for Current Multiple Myeloma , being treated by Dr Solano.      PSYCHIATRIC:  Positive for depression ( (Improved with Lexapro , no problems with medication, Wife is end stage Alz) ).   Negative for suicidal thoughts.          Past Medical History / Family History / Social History:         Last Reviewed on 12/11/2019 10:51 AM by Yenifer Shafer    Past Medical History:         Positive for    Primary Biliary Cirrhosis;         CURRENT MEDICAL PROVIDERS:    Oncologist: Star Solano             ADVANCED DIRECTIVES: None         PREVENTIVE HEALTH MAINTENANCE             COLORECTAL CANCER SCREENING: Up to date (colonoscopy q10y; sigmoidoscopy q5y; Cologuard q3y) was last done 8-2012, Results are in chart; colonoscopy with normal results     PSA: was last done 12/11/19         Surgical History:     Procedures:  colonoscopy , [id number]     Other Surgeries:    skin cancer remove right face;         Family History:     Father:  at age 69; Cause of death was Stroke     Mother:  at age 85; Cause of death was old age     Brother(s): 3 brother(s) total;  Coronary Artery Disease ( CABG ); Hypertension;  Depression     Sister(s): Healthy; 1 sister(s) total; # that are           Social History:     Occupation: Retired (Prior occupation: [employer])     Marital Status:      Children: 5 children         Tobacco/Alcohol/Supplements:     Last Reviewed on 2019 10:51 AM by Yenifer Shafer    Tobacco: He has never smoked.  Non-drinker         Substance Abuse History:     Last Reviewed on 2019 10:51 AM by Yenifer Shafer        Mental Health History:     Last Reviewed on 2019 10:51 AM by Yenifer Shafer        Communicable Diseases (eg STDs):     Last Reviewed on 2019 10:51 AM by Yenifer Shafer        Current Problems:     Last Reviewed on 2019 10:51 AM by Yenifer Shafer    Primary biliary cirrhosis    Mixed hyperlipidemia    Chronic kidney disease, stage 2 (mild)    Multiple myeloma not having achieved remission    Hypothyroidism, unspecified    Essential (primary) hypertension    Basal cell carcinoma of skin of unspecified parts of face    Monoclonal gammopathy    Actinic keratosis    Persistent 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        Current  Medications:     Last Reviewed on 12/11/2019 10:51 AM by Yenifer Shafer    fenofibrate nanocrystallized 145 mg oral tablet [Take 1 tablet(s) by mouth daily]    levothyroxine 50 mcg oral tablet [1 tab daily ]    Lexapro 10mg Tablet [1 po q HS ]    Chemo         Objective:        Vitals:         Current: 12/11/2019 10:33:51 AM    Ht:  5 ft, 8 in;  Wt: 126.8 lbs;  BMI: 19.3T: 97.7 F (oral);  BP: 154/81 mm Hg (left arm, sitting);  P: 73 bpm (left arm (BP Cuff), sitting);  sCr: 1.62 mg/dL;  GFR: 29.08VA: 20/20 OD, 20/20 OS (near, with correction)        Repeat:     10:39:3 AM  BP:   153/74mm Hg (left arm, sitting, HR: 68)     Exams:     PHYSICAL EXAM:     GENERAL: Vitals recorded well developed, well nourished;  well groomed;  no apparent distress;     NECK: carotid exam is normal with good upstroke and no bruits;     RESPIRATORY: normal respiratory rate and pattern with no distress; normal breath sounds with no rales, rhonchi, wheezes or rubs;     CARDIOVASCULAR: normal rate; rhythm is regular;  no systolic murmur; no edema;     GASTROINTESTINAL: nontender, nondistended; no hepatosplenomegaly or masses; no bruits;     NEUROLOGIC: CN 2-12, motor and sensory function, reflexes, gait and coordination are all intact;     PSYCHIATRIC:  appropriate affect and demeanor; normal speech pattern; grossly normal memory;         Assessment:         Z00.00   Encounter for general adult medical examination without abnormal findings       Z13.31   Encounter for screening for depression       Z12.5   Encounter for screening for malignant neoplasm of prostate       Z13.220   Encounter for screening for lipoid disorders       Z23   Encounter for immunization           ORDERS:         Lab Orders:       *  PRSAS Medicare screening PSA  (Send-Out)            34260  [facility] Lipid Panel  (Send-Out)              Procedures Ordered:       51164  Fluzone High Dose  (In-House)              Annual wellness visit, includes a PPPS,  subsequent visit  (In-House)              Other Orders:         Depression screen positive and follow up plan documented  (In-House)              Administration of influenza virus vaccine  (x1)                  Plan:         Encounter for general adult medical examination without abnormal findings    ADVANCE DIRECTIVES (Please update in PMH): Living will Discussed need for Living will and POA. .dmt          Orders:         Annual wellness visit, includes a PPPS, subsequent visit  (In-House)              Encounter for screening for depression    MIPS PHQ-9 Depression Screening: Completed form scanned and in chart; Total Score 5 Positive Depression Screen: Stable on medications. No suicidal ideation.            Orders:         Depression screen positive and follow up plan documented  (In-House)              Encounter for screening for malignant neoplasm of prostate    LABORATORY:  Labs ordered to be performed today include PSA.            Orders:       *  PRSAS Medicare screening PSA  (Send-Out)              Encounter for screening for lipoid disorders    LABORATORY:  Labs ordered to be performed today include lipid panel.            Orders:       [id number]  [facility] Lipid Panel  (Send-Out)              Encounter for immunization        IMMUNIZATIONS given today: Influenza HIGH Dose.            Immunizations:       20274  Fluzone High Dose  (In-House)                Dose (ml): 0.5  Site: left deltoid  Route: intramuscular  Administered by: Ellyn Byrd          : [employer]  Lot #: [id number]  Exp: 06/15/2020          NDC: [address]-01        Administration of influenza virus vaccine  (x1)              Charge Capture:         Primary Diagnosis:     Z00.00  Encounter for general adult medical examination without abnormal findings           Orders:      56421  Preventive medicine, established patient, age 65+ years  (In-House)              Annual wellness visit, includes  a PPPS, subsequent visit  (In-House)              Z13.31  Encounter for screening for depression           Orders:        Depression screen positive and follow up plan documented  (In-House)              Z12.5  Encounter for screening for malignant neoplasm of prostate     Z13.220  Encounter for screening for lipoid disorders     Z23  Encounter for immunization           Orders:      97513  Fluzone High Dose  (In-House)              Administration of influenza virus vaccine  (x1)

## 2021-06-23 RX ORDER — LORAZEPAM 0.5 MG/1
TABLET ORAL
Qty: 5 TABLET | Refills: 0 | Status: SHIPPED | OUTPATIENT
Start: 2021-06-23 | End: 2022-09-20 | Stop reason: SDUPTHER

## 2021-06-23 NOTE — TELEPHONE ENCOUNTER
LAST OV: 12/22/20  NEXT OV: None  LAST REFILL: 10/27/20 #20  LAST UDS: 10/27/20    PLEASE SIGN OR ADVISE

## 2021-07-01 VITALS
HEIGHT: 68 IN | TEMPERATURE: 97.3 F | DIASTOLIC BLOOD PRESSURE: 66 MMHG | SYSTOLIC BLOOD PRESSURE: 140 MMHG | HEART RATE: 66 BPM | BODY MASS INDEX: 20.34 KG/M2 | WEIGHT: 134.2 LBS

## 2021-07-01 VITALS
TEMPERATURE: 97.5 F | DIASTOLIC BLOOD PRESSURE: 78 MMHG | SYSTOLIC BLOOD PRESSURE: 132 MMHG | WEIGHT: 140 LBS | BODY MASS INDEX: 21.22 KG/M2 | HEART RATE: 71 BPM | HEIGHT: 68 IN

## 2021-07-01 VITALS
DIASTOLIC BLOOD PRESSURE: 74 MMHG | TEMPERATURE: 97.7 F | HEIGHT: 68 IN | SYSTOLIC BLOOD PRESSURE: 153 MMHG | WEIGHT: 126.8 LBS | HEART RATE: 73 BPM | BODY MASS INDEX: 19.22 KG/M2

## 2021-07-01 VITALS
TEMPERATURE: 97.7 F | HEART RATE: 71 BPM | SYSTOLIC BLOOD PRESSURE: 124 MMHG | DIASTOLIC BLOOD PRESSURE: 60 MMHG | HEIGHT: 68 IN | WEIGHT: 135.8 LBS | BODY MASS INDEX: 20.58 KG/M2

## 2021-07-01 VITALS
DIASTOLIC BLOOD PRESSURE: 64 MMHG | HEART RATE: 67 BPM | HEIGHT: 68 IN | WEIGHT: 134.8 LBS | SYSTOLIC BLOOD PRESSURE: 131 MMHG | TEMPERATURE: 97.3 F | BODY MASS INDEX: 20.43 KG/M2

## 2021-07-01 VITALS
TEMPERATURE: 97.3 F | HEIGHT: 68 IN | SYSTOLIC BLOOD PRESSURE: 130 MMHG | HEART RATE: 71 BPM | DIASTOLIC BLOOD PRESSURE: 62 MMHG | BODY MASS INDEX: 20.37 KG/M2 | WEIGHT: 134.4 LBS

## 2021-07-02 VITALS
HEIGHT: 68 IN | DIASTOLIC BLOOD PRESSURE: 60 MMHG | WEIGHT: 132.4 LBS | BODY MASS INDEX: 20.07 KG/M2 | HEART RATE: 68 BPM | SYSTOLIC BLOOD PRESSURE: 147 MMHG | TEMPERATURE: 97.3 F

## 2021-11-16 ENCOUNTER — OFFICE VISIT (OUTPATIENT)
Dept: FAMILY MEDICINE CLINIC | Age: 86
End: 2021-11-16

## 2021-11-16 VITALS
WEIGHT: 132 LBS | BODY MASS INDEX: 20 KG/M2 | DIASTOLIC BLOOD PRESSURE: 64 MMHG | TEMPERATURE: 98.7 F | HEIGHT: 68 IN | HEART RATE: 68 BPM | SYSTOLIC BLOOD PRESSURE: 135 MMHG

## 2021-11-16 DIAGNOSIS — Z00.00 ROUTINE GENERAL MEDICAL EXAMINATION AT A HEALTH CARE FACILITY: Primary | ICD-10-CM

## 2021-11-16 PROBLEM — E03.9 HYPOTHYROIDISM: Status: ACTIVE | Noted: 2021-11-16

## 2021-11-16 PROBLEM — C95.90 LEUKEMIA: Status: ACTIVE | Noted: 2021-11-16

## 2021-11-16 RX ORDER — DEXAMETHASONE 4 MG/1
TABLET ORAL
COMMUNITY
End: 2021-11-29

## 2021-11-16 RX ORDER — ASPIRIN 81 MG/1
81 TABLET ORAL DAILY
COMMUNITY
End: 2022-09-20

## 2021-11-16 RX ORDER — ESCITALOPRAM OXALATE 10 MG/1
10 TABLET ORAL DAILY
COMMUNITY
End: 2022-09-20

## 2021-11-16 RX ORDER — LEVOTHYROXINE SODIUM 0.05 MG/1
50 TABLET ORAL
COMMUNITY
Start: 2021-10-20

## 2021-11-29 ENCOUNTER — OFFICE VISIT (OUTPATIENT)
Dept: FAMILY MEDICINE CLINIC | Age: 86
End: 2021-11-29

## 2021-11-29 VITALS
HEIGHT: 68 IN | TEMPERATURE: 98.4 F | OXYGEN SATURATION: 97 % | WEIGHT: 132.7 LBS | BODY MASS INDEX: 20.11 KG/M2 | HEART RATE: 65 BPM | DIASTOLIC BLOOD PRESSURE: 80 MMHG | SYSTOLIC BLOOD PRESSURE: 151 MMHG

## 2021-11-29 DIAGNOSIS — R42 DIZZINESS: Primary | ICD-10-CM

## 2021-11-29 PROCEDURE — 99213 OFFICE O/P EST LOW 20 MIN: CPT | Performed by: NURSE PRACTITIONER

## 2021-11-29 NOTE — PROGRESS NOTES
"Chief Complaint  Dizziness (\"had chemo last tuesday and hasnt felt good ever since\")    Subjective    Patient is a 86 year old male in today with complaints of feeling off balanced while walking after last chemo dose on Tuesday. Patient reports they usually give him fluids before but didn't this time. Patient reports only drinking about one bottle of water per day. Patient denies chest pain, shortness of breath, ear pain or fever.          Eze Jauregui presents to [facility]  Dizziness  This is a new problem. The current episode started in the past 7 days. The problem occurs intermittently. The problem has been waxing and waning. Associated symptoms include vertigo. Pertinent negatives include no chills, coughing, fever, headaches or nausea. The symptoms are aggravated by standing and walking. He has tried rest for the symptoms. The treatment provided mild relief.       Objective   Vital Signs:   /80 (BP Location: Right arm, Patient Position: Sitting)   Pulse 65   Temp 98.4 °F (36.9 °C)   Ht 172.7 cm (67.99\")   Wt 60.2 kg (132 lb 11.2 oz)   SpO2 97%   BMI 20.18 kg/m²     Physical Exam  HENT:      Head: Normocephalic.      Right Ear: Tympanic membrane, ear canal and external ear normal.      Left Ear: Tympanic membrane, ear canal and external ear normal.      Nose: Nose normal.      Mouth/Throat:      Mouth: Mucous membranes are moist.      Pharynx: Oropharynx is clear.   Eyes:      Conjunctiva/sclera: Conjunctivae normal.   Cardiovascular:      Rate and Rhythm: Normal rate and regular rhythm.      Pulses: Normal pulses.      Heart sounds: Normal heart sounds.   Pulmonary:      Effort: Pulmonary effort is normal. No respiratory distress.      Breath sounds: Normal breath sounds. No stridor. No wheezing, rhonchi or rales.   Chest:      Chest wall: No tenderness.   Skin:     General: Skin is warm and dry.      Capillary Refill: Capillary refill takes less than 2 " seconds.   Neurological:      General: No focal deficit present.      Mental Status: He is alert and oriented to person, place, and time.   Psychiatric:         Mood and Affect: Mood normal.        Result Review :                 Assessment and Plan    Diagnoses and all orders for this visit:    1. Dizziness (Primary)  Comments:  Increase fluid intake  Monitor BP at home and bring log to next appointment        Follow Up   Return in about 4 weeks (around 12/27/2021), or if symptoms worsen or fail to improve.  Patient was given instructions and counseling regarding his condition or for health maintenance advice. Please see specific information pulled into the AVS if appropriate.

## 2021-12-27 ENCOUNTER — OFFICE VISIT (OUTPATIENT)
Dept: FAMILY MEDICINE CLINIC | Age: 86
End: 2021-12-27

## 2021-12-27 VITALS
DIASTOLIC BLOOD PRESSURE: 80 MMHG | BODY MASS INDEX: 20.16 KG/M2 | OXYGEN SATURATION: 98 % | HEIGHT: 68 IN | HEART RATE: 67 BPM | WEIGHT: 133 LBS | SYSTOLIC BLOOD PRESSURE: 157 MMHG

## 2021-12-27 DIAGNOSIS — C95.90 LEUKEMIA NOT HAVING ACHIEVED REMISSION, UNSPECIFIED LEUKEMIA TYPE (HCC): Chronic | ICD-10-CM

## 2021-12-27 DIAGNOSIS — Z00.00 ROUTINE GENERAL MEDICAL EXAMINATION AT A HEALTH CARE FACILITY: Primary | ICD-10-CM

## 2021-12-27 DIAGNOSIS — E03.9 HYPOTHYROIDISM, UNSPECIFIED TYPE: Chronic | ICD-10-CM

## 2021-12-27 DIAGNOSIS — F41.9 ANXIETY: Chronic | ICD-10-CM

## 2021-12-27 PROCEDURE — 1159F MED LIST DOCD IN RCRD: CPT | Performed by: NURSE PRACTITIONER

## 2021-12-27 PROCEDURE — 1170F FXNL STATUS ASSESSED: CPT | Performed by: NURSE PRACTITIONER

## 2021-12-27 PROCEDURE — G0439 PPPS, SUBSEQ VISIT: HCPCS | Performed by: NURSE PRACTITIONER

## 2021-12-27 NOTE — PROGRESS NOTES
The ABCs of the Annual Wellness Visit  Subsequent Medicare Wellness Visit    Chief Complaint   Patient presents with   • Medicare Wellness-subsequent      Subjective    History of Present Illness:  Eze Jauregui is a 86 y.o. male who presents for a Subsequent Medicare Wellness Visit.    The following portions of the patient's history were reviewed and   updated as appropriate: allergies, current medications, past family history, past medical history, past social history, past surgical history and problem list.    Compared to one year ago, the patient feels his physical   health is the same.    Compared to one year ago, the patient feels his mental   health is the same.    Recent Hospitalizations:  He was not admitted to the hospital during the last year.       Current Medical Providers:  Patient Care Team:  Edel Chowdary APRN as PCP - General (Nurse Practitioner)  Star Solano MD (Hematology and Oncology)  Elizabeth Saha MD (Dermatology)    Outpatient Medications Prior to Visit   Medication Sig Dispense Refill   • aspirin (aspirin) 81 MG EC tablet Once a month     • escitalopram (LEXAPRO) 10 MG tablet Take 10 mg by mouth Daily.     • levothyroxine (SYNTHROID, LEVOTHROID) 50 MCG tablet Take 50 mcg by mouth Every Morning Before Breakfast.     • LORazepam (ATIVAN) 0.5 MG tablet TAKE 1/2 TABLET AT BEDTIME AS NEEDED FOR ANXIETY , TAKE SPARINGLY CAN CAUSE DIZZINESS 5 tablet 0     No facility-administered medications prior to visit.       No opioid medication identified on active medication list. I have reviewed chart for other potential  high risk medication/s and harmful drug interactions in the elderly.          Aspirin is on active medication list. takes one time monthly with Chemo.      Patient Active Problem List   Diagnosis   • Hypothyroidism   • Leukemia (HCC)   • Anxiety     Advance Care Planning  Advance Directive is not on file.  ACP discussion was held with the patient during this visit.  "Patient has an advance directive (not in EMR), copy requested.    Review of Systems   Constitutional: Positive for fatigue (occasional ).   HENT: Negative for congestion and sinus pressure.    Eyes: Negative for visual disturbance.   Respiratory: Negative for shortness of breath.    Cardiovascular: Negative for chest pain.   Gastrointestinal: Positive for constipation (prune juice works well).   Genitourinary: Positive for frequency (up at night- but not a problem with interfering with ADL). Negative for urgency.   Musculoskeletal: Negative for back pain.   Neurological: Negative for numbness.   Psychiatric/Behavioral: Negative for sleep disturbance.        Objective    Vitals:    12/27/21 1253   BP: 157/80   BP Location: Left arm   Patient Position: Sitting   Cuff Size: Adult   Pulse: 67   SpO2: 98%   Weight: 60.3 kg (133 lb)   Height: 172.7 cm (68\")     BMI Readings from Last 1 Encounters:   12/27/21 20.22 kg/m²   BMI is within normal parameters. No follow-up required.    Does the patient have evidence of cognitive impairment? No    Physical Exam  Vitals reviewed.   Constitutional:       Appearance: Normal appearance.   HENT:      Head: Normocephalic.   Eyes:      Pupils: Pupils are equal, round, and reactive to light.   Cardiovascular:      Rate and Rhythm: Normal rate and regular rhythm.      Heart sounds: No murmur heard.      Pulmonary:      Effort: Pulmonary effort is normal.      Breath sounds: Normal breath sounds.   Musculoskeletal:         General: Normal range of motion.   Neurological:      Mental Status: He is alert.   Psychiatric:         Mood and Affect: Mood normal.         Behavior: Behavior normal.                 HEALTH RISK ASSESSMENT    Smoking Status:  Social History     Tobacco Use   Smoking Status Never Smoker   Smokeless Tobacco Never Used     Alcohol Consumption:  Social History     Substance and Sexual Activity   Alcohol Use Not Currently     Fall Risk Screen:    STEADI Fall Risk " Assessment was completed, and patient is at LOW risk for falls.Assessment completed on:11/16/2021    Depression Screening:  PHQ-2/PHQ-9 Depression Screening 11/16/2021   Little interest or pleasure in doing things 0   Feeling down, depressed, or hopeless 0   Total Score 0       Health Habits and Functional and Cognitive Screening:  Functional & Cognitive Status 11/16/2021   Do you have difficulty preparing food and eating? No   Do you have difficulty bathing yourself, getting dressed or grooming yourself? No   Do you have difficulty using the toilet? No   Do you have difficulty moving around from place to place? No   Do you have trouble with steps or getting out of a bed or a chair? No   Current Diet Well Balanced Diet   Dental Exam Up to date   Eye Exam Up to date   Exercise (times per week) 1 times per week   Current Exercises Include Walking;Yard Work   Do you need help using the phone?  No   Are you deaf or do you have serious difficulty hearing?  Yes   Do you need help with transportation? No   Do you need help shopping? No   Do you need help preparing meals?  No   Do you need help with housework?  No   Do you need help with laundry? No   Do you need help taking your medications? No   Do you need help managing money? Yes   Do you ever drive or ride in a car without wearing a seat belt? No   Have you felt unusual stress, anger or loneliness in the last month? No   Who do you live with? Alone   If you need help, do you have trouble finding someone available to you? No   Have you been bothered in the last four weeks by sexual problems? No   Do you have difficulty concentrating, remembering or making decisions? Yes       Age-appropriate Screening Schedule:  Refer to the list below for future screening recommendations based on patient's age, sex and/or medical conditions. Orders for these recommended tests are listed in the plan section. The patient has been provided with a written plan.    Health Maintenance    Topic Date Due   • TDAP/TD VACCINES (1 - Tdap) Never done   • ZOSTER VACCINE (1 of 2) 12/27/2021 (Originally 10/2/1985)   • INFLUENZA VACCINE  Completed              Assessment/Plan   CMS Preventative Services Quick Reference  Risk Factors Identified During Encounter  None Identified  The above risks/problems have been discussed with the patient.  Follow up actions/plans if indicated are seen below in the Assessment/Plan Section.  Pertinent information has been shared with the patient in the After Visit Summary.    Diagnoses and all orders for this visit:    1. Routine general medical examination at a health care facility (Primary)  Comments:  declines any further vaccines at this time    2. Hypothyroidism, unspecified type  Comments:  continue current treatment and follow up labs per Dr. Solano   Assessment & Plan:  Currently taking levothyroxine 50 mcg      3. 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 Solano as recommended     4. Anxiety  Comments:  follow up PRN      Follow Up:   Return in about 1 year (around 12/27/2022) for Medicare Wellness.     An After Visit Summary and PPPS were made available to the patient.

## 2022-07-13 ENCOUNTER — OFFICE VISIT (OUTPATIENT)
Dept: FAMILY MEDICINE CLINIC | Age: 87
End: 2022-07-13

## 2022-07-13 VITALS
HEART RATE: 65 BPM | DIASTOLIC BLOOD PRESSURE: 78 MMHG | WEIGHT: 131.6 LBS | TEMPERATURE: 98.4 F | HEIGHT: 68 IN | BODY MASS INDEX: 19.94 KG/M2 | SYSTOLIC BLOOD PRESSURE: 146 MMHG

## 2022-07-13 DIAGNOSIS — R13.19 OTHER DYSPHAGIA: Primary | ICD-10-CM

## 2022-07-13 DIAGNOSIS — K21.9 GASTROESOPHAGEAL REFLUX DISEASE, UNSPECIFIED WHETHER ESOPHAGITIS PRESENT: ICD-10-CM

## 2022-07-13 PROCEDURE — 99213 OFFICE O/P EST LOW 20 MIN: CPT | Performed by: NURSE PRACTITIONER

## 2022-07-13 RX ORDER — OMEPRAZOLE 20 MG/1
20 CAPSULE, DELAYED RELEASE ORAL DAILY
Qty: 14 CAPSULE | Refills: 0 | Status: SHIPPED | OUTPATIENT
Start: 2022-07-13 | End: 2022-09-20 | Stop reason: SDUPTHER

## 2022-07-13 NOTE — PROGRESS NOTES
"Assessment and Plan    Diagnoses and all orders for this visit:    1. Other dysphagia (Primary)  Comments:  I will get him in general surgery for probable upper endoscopy.  I have advised him to resume his Prilosec for at least a couple of weeks pending appointment  Orders:  -     omeprazole (priLOSEC) 20 MG capsule; Take 1 capsule by mouth Daily.  Dispense: 14 capsule; Refill: 0  -     Ambulatory Referral to General Surgery    2. Gastroesophageal reflux disease, unspecified whether esophagitis present  Comments:  Resume Prilosec x2 weeks        Follow Up   Return if symptoms worsen or fail to improve.    Chief Complaint  Eze Jauregui presents to [facility] for Food gets \"lodged\" in throat with eating    Subjective          Mr. Jauregui is here today to discuss swallowing problem.  He reports that this has been off on for at least 6 months, but worse over the past 2 weeks.  He reports that most of the food gets lodged is the dry food-mostly meat.  He has no difficulty swallowing thin liquids.  No problem with initiating a swallow, but will feel like it is always getting lodged lower in the esophagus and is able to point to the location today.  He does report that he has been having some increased acid reflux and has been off of his Prilosec for years.  He has been Dr. Kimball in the past and would like to go back to him if he is available for this procedure.         Review of Systems    Objective     Vitals:    07/13/22 1330 07/13/22 1332   BP: 161/84 146/78   BP Location: Right arm Left arm   Patient Position: Sitting Sitting   Pulse: 64 65   Temp: 98.4 °F (36.9 °C)    TempSrc: Oral    Weight: 59.7 kg (131 lb 9.6 oz)    Height: 172.7 cm (67.99\")      Body mass index is 20.01 kg/m².     Physical Exam  Vitals reviewed.   Constitutional:       Appearance: Normal appearance.   HENT:      Head: Normocephalic.   Eyes:      Pupils: Pupils are equal, round, and reactive to light. "   Cardiovascular:      Rate and Rhythm: Normal rate and regular rhythm.      Heart sounds: No murmur heard.  Pulmonary:      Effort: Pulmonary effort is normal.      Breath sounds: Normal breath sounds.   Abdominal:      General: Abdomen is flat.      Tenderness: There is no abdominal tenderness.   Musculoskeletal:         General: Normal range of motion.   Neurological:      Mental Status: He is alert.   Psychiatric:         Mood and Affect: Mood normal.         Behavior: Behavior normal.         Result Review :                    No Known Allergies   No past medical history on file.  Current Outpatient Medications   Medication Sig Dispense Refill   • aspirin 81 MG EC tablet Take 81 mg by mouth Daily. Once a month     • escitalopram (LEXAPRO) 10 MG tablet Take 10 mg by mouth Daily.     • levothyroxine (SYNTHROID, LEVOTHROID) 50 MCG tablet Take 50 mcg by mouth Every Morning Before Breakfast.     • LORazepam (ATIVAN) 0.5 MG tablet TAKE 1/2 TABLET AT BEDTIME AS NEEDED FOR ANXIETY , TAKE SPARINGLY CAN CAUSE DIZZINESS 5 tablet 0   • omeprazole (priLOSEC) 20 MG capsule Take 1 capsule by mouth Daily. 14 capsule 0     No current facility-administered medications for this visit.     No past surgical history on file.   Social History     Tobacco Use   • Smoking status: Never Smoker   • Smokeless tobacco: Never Used   Vaping Use   • Vaping Use: Never used   Substance Use Topics   • Alcohol use: Not Currently   • Drug use: Never     No family history on file.  Health Maintenance Due   Topic Date Due   • TDAP/TD VACCINES (1 - Tdap) Never done   • ZOSTER VACCINE (1 of 2) Never done   • COVID-19 Vaccine (2 - Yadira risk series) 04/05/2021      Immunization History   Administered Date(s) Administered   • COVID-19 (YADIRA) 03/08/2021   • Flu Vaccine Quad PF >36MO 11/16/2021   • Flu Vaccine Split Quad 11/16/2021   • Influenza Quad Vaccine (Inpatient) 11/13/2013

## 2022-08-04 ENCOUNTER — HOSPITAL ENCOUNTER (OUTPATIENT)
Dept: HOSPITAL 49 - FAS | Age: 87
Discharge: HOME | End: 2022-08-04
Attending: SURGERY
Payer: MEDICARE

## 2022-08-04 VITALS — WEIGHT: 131.09 LBS | BODY MASS INDEX: 20.57 KG/M2 | HEIGHT: 67 IN

## 2022-08-04 DIAGNOSIS — N18.30: ICD-10-CM

## 2022-08-04 DIAGNOSIS — E78.5: ICD-10-CM

## 2022-08-04 DIAGNOSIS — Z79.82: ICD-10-CM

## 2022-08-04 DIAGNOSIS — E03.9: ICD-10-CM

## 2022-08-04 DIAGNOSIS — K22.2: ICD-10-CM

## 2022-08-04 DIAGNOSIS — K29.50: Primary | ICD-10-CM

## 2022-08-04 DIAGNOSIS — K21.9: ICD-10-CM

## 2022-08-04 LAB
ALBUMIN SERPL-MCNC: 3.3 G/DL (ref 3.4–5)
ALKALINE PHOSHATASE: 327 U/L (ref 46–116)
ALT SERPL-CCNC: 41 U/L (ref 16–63)
AST: 44 U/L (ref 15–37)
BILIRUBIN - TOTAL: 0.4 MG/DL (ref 0.2–1)
BUN SERPL-MCNC: 20 MG/DL (ref 7–18)
BUN/CREAT RATIO (CALC): 14.9 RATIO
CHLORIDE: 106 MMOL/L (ref 98–107)
CO2 (BICARBONATE): 24 MMOL/L (ref 21–32)
CREATININE: 1.34 MG/DL (ref 0.67–1.17)
GLOBULIN (CALCULATION): 5.2 G/DL
GLUCOSE SERPL-MCNC: 99 MG/DL (ref 74–106)
HCT: 42.7 % (ref 42–52)
HGB BLD-MCNC: 14 G/DL (ref 13.2–18)
MCH RBC QN AUTO: 30.6 PG (ref 25–31)
MCHC RBC AUTO-ENTMCNC: 32.8 G/DL (ref 32–36)
MCV: 93.2 FL (ref 78–100)
MPV: 9.9 FL (ref 6–9.5)
PLT: 222 K/UL (ref 150–400)
POTASSIUM: 4.2 MMOL/L (ref 3.5–5.1)
RBC: 4.58 M/UL (ref 4.7–6)
RDW: 13 % (ref 11.5–14)
TOTAL PROTEIN: 8.5 G/DL (ref 6.4–8.2)
WBC: 7.5 K/UL (ref 4–10.5)

## 2022-08-31 ENCOUNTER — HOSPITAL ENCOUNTER (OUTPATIENT)
Dept: HOSPITAL 49 - FAS | Age: 87
Discharge: HOME | End: 2022-08-31
Attending: SURGERY
Payer: MEDICARE

## 2022-08-31 VITALS — BODY MASS INDEX: 20.57 KG/M2 | WEIGHT: 131.09 LBS | HEIGHT: 67 IN

## 2022-08-31 DIAGNOSIS — K21.9: ICD-10-CM

## 2022-08-31 DIAGNOSIS — K29.50: Primary | ICD-10-CM

## 2022-08-31 DIAGNOSIS — E03.9: ICD-10-CM

## 2022-08-31 DIAGNOSIS — K22.2: ICD-10-CM

## 2022-08-31 DIAGNOSIS — N18.30: ICD-10-CM

## 2022-08-31 DIAGNOSIS — Z79.82: ICD-10-CM

## 2022-08-31 LAB
BUN SERPL-MCNC: 19 MG/DL (ref 7–18)
BUN/CREAT RATIO (CALC): 13.8 RATIO
CHLORIDE: 101 MMOL/L (ref 98–107)
CO2 (BICARBONATE): 27 MMOL/L (ref 21–32)
CREATININE: 1.38 MG/DL (ref 0.67–1.17)
GLUCOSE SERPL-MCNC: 91 MG/DL (ref 74–106)
HCT: 43.4 % (ref 42–52)
HGB BLD-MCNC: 14.2 G/DL (ref 13.2–18)
MCH RBC QN AUTO: 30.4 PG (ref 25–31)
MCHC RBC AUTO-ENTMCNC: 32.7 G/DL (ref 32–36)
MCV: 92.9 FL (ref 78–100)
MPV: 10.8 FL (ref 6–9.5)
PLT: 242 K/UL (ref 150–400)
POTASSIUM: 3.9 MMOL/L (ref 3.5–5.1)
RBC: 4.67 M/UL (ref 4.7–6)
RDW: 13.2 % (ref 11.5–14)
WBC: 8.8 K/UL (ref 4–10.5)

## 2022-08-31 PROCEDURE — C1726 CATH, BAL DIL, NON-VASCULAR: HCPCS

## 2022-09-20 ENCOUNTER — OFFICE VISIT (OUTPATIENT)
Dept: FAMILY MEDICINE CLINIC | Age: 87
End: 2022-09-20

## 2022-09-20 VITALS
OXYGEN SATURATION: 98 % | TEMPERATURE: 98 F | WEIGHT: 126.6 LBS | BODY MASS INDEX: 19.19 KG/M2 | HEART RATE: 65 BPM | SYSTOLIC BLOOD PRESSURE: 167 MMHG | HEIGHT: 68 IN | DIASTOLIC BLOOD PRESSURE: 83 MMHG

## 2022-09-20 DIAGNOSIS — R53.1 WEAKNESS: Primary | ICD-10-CM

## 2022-09-20 DIAGNOSIS — H11.32 BURST BLOOD VESSEL OF LEFT EYE: ICD-10-CM

## 2022-09-20 DIAGNOSIS — F41.9 ANXIETY: ICD-10-CM

## 2022-09-20 PROCEDURE — 99214 OFFICE O/P EST MOD 30 MIN: CPT | Performed by: NURSE PRACTITIONER

## 2022-09-20 RX ORDER — FERROUS SULFATE 325(65) MG
325 TABLET ORAL
Qty: 90 TABLET | Refills: 0 | Status: SHIPPED | OUTPATIENT
Start: 2022-09-20

## 2022-09-20 RX ORDER — OMEPRAZOLE 40 MG/1
40 CAPSULE, DELAYED RELEASE ORAL DAILY
COMMUNITY
Start: 2022-09-02

## 2022-09-20 RX ORDER — LORAZEPAM 0.5 MG/1
0.5 TABLET ORAL EVERY 8 HOURS PRN
Qty: 5 TABLET | Refills: 0 | Status: SHIPPED | OUTPATIENT
Start: 2022-09-20

## 2022-09-20 NOTE — PROGRESS NOTES
"Chief Complaint  Fatigue (Feeling weak)    Subjective        Eze Jauregui presents to [facility]  Fatigue  This is a recurrent problem. The current episode started 1 to 4 weeks ago. The problem occurs daily. The problem has been gradually worsening. Associated symptoms include chills, fatigue and weakness. Pertinent negatives include no congestion, coughing, fever or urinary symptoms. The symptoms are aggravated by exertion. He has tried rest for the symptoms. The treatment provided no relief.       Objective   Vital Signs:  /83 (BP Location: Left arm, Patient Position: Sitting)   Pulse 65   Temp 98 °F (36.7 °C) (Oral)   Ht 172.7 cm (68\")   Wt 57.4 kg (126 lb 9.6 oz)   SpO2 98% Comment: on room air  BMI 19.25 kg/m²   Estimated body mass index is 19.25 kg/m² as calculated from the following:    Height as of this encounter: 172.7 cm (68\").    Weight as of this encounter: 57.4 kg (126 lb 9.6 oz).    BMI is within normal parameters. No other follow-up for BMI required.      Physical Exam  HENT:      Head: Normocephalic.   Eyes:      Conjunctiva/sclera:      Left eye: Left conjunctiva is injected.   Cardiovascular:      Rate and Rhythm: Normal rate.   Pulmonary:      Effort: Pulmonary effort is normal. No respiratory distress.      Breath sounds: Normal breath sounds. No stridor. No wheezing, rhonchi or rales.   Skin:     General: Skin is warm and dry.   Neurological:      Mental Status: He is alert and oriented to person, place, and time.   Psychiatric:         Mood and Affect: Mood normal.        Result Review :                Assessment and Plan   Diagnoses and all orders for this visit:    1. Weakness (Primary)  Comments:  Current with hematology, recommend calling to get sooner labs to assess for anemia  Orders:  -     ferrous sulfate (FerrouSul) 325 (65 FE) MG tablet; Take 1 tablet by mouth Daily With Breakfast.  Dispense: 90 tablet; Refill: 0    2. " Anxiety  Comments:  Uses medications minimally. October is a rough month for him due to loosing his wife and her birthday and anniversary, will refill medication  Orders:  -     LORazepam (ATIVAN) 0.5 MG tablet; Take 1 tablet by mouth Every 8 (Eight) Hours As Needed for Anxiety.  Dispense: 5 tablet; Refill: 0    3. Burst blood vessel of left eye  Comments:  Vision not affected at this time, call hemotology for labs, eye doctor if vision changes or for reoccurence for pressure check             Follow Up   Return in about 3 months (around 12/20/2022), or if symptoms worsen or fail to improve, for Next scheduled follow up with PCP.  Patient was given instructions and counseling regarding his condition or for health maintenance advice. Please see specific information pulled into the AVS if appropriate.

## 2023-01-06 ENCOUNTER — OFFICE VISIT (OUTPATIENT)
Dept: FAMILY MEDICINE CLINIC | Age: 88
End: 2023-01-06
Payer: MEDICARE

## 2023-01-06 VITALS
WEIGHT: 128.8 LBS | TEMPERATURE: 97.7 F | BODY MASS INDEX: 19.52 KG/M2 | DIASTOLIC BLOOD PRESSURE: 77 MMHG | SYSTOLIC BLOOD PRESSURE: 175 MMHG | OXYGEN SATURATION: 97 % | HEART RATE: 59 BPM | HEIGHT: 68 IN

## 2023-01-06 DIAGNOSIS — Z23 ENCOUNTER FOR IMMUNIZATION: ICD-10-CM

## 2023-01-06 DIAGNOSIS — Z00.00 MEDICARE ANNUAL WELLNESS VISIT, SUBSEQUENT: Primary | ICD-10-CM

## 2023-01-06 DIAGNOSIS — I10 HYPERTENSION, UNSPECIFIED TYPE: ICD-10-CM

## 2023-01-06 PROBLEM — D64.9 ANEMIA: Status: ACTIVE | Noted: 2022-12-05

## 2023-01-06 PROBLEM — C90.00: Status: ACTIVE | Noted: 2022-12-05

## 2023-01-06 PROCEDURE — G0008 ADMIN INFLUENZA VIRUS VAC: HCPCS | Performed by: NURSE PRACTITIONER

## 2023-01-06 PROCEDURE — 1170F FXNL STATUS ASSESSED: CPT | Performed by: NURSE PRACTITIONER

## 2023-01-06 PROCEDURE — G0439 PPPS, SUBSEQ VISIT: HCPCS | Performed by: NURSE PRACTITIONER

## 2023-01-06 PROCEDURE — 1125F AMNT PAIN NOTED PAIN PRSNT: CPT | Performed by: NURSE PRACTITIONER

## 2023-01-06 PROCEDURE — 90662 IIV NO PRSV INCREASED AG IM: CPT | Performed by: NURSE PRACTITIONER

## 2023-01-06 PROCEDURE — 1159F MED LIST DOCD IN RCRD: CPT | Performed by: NURSE PRACTITIONER

## 2023-01-06 PROCEDURE — 1160F RVW MEDS BY RX/DR IN RCRD: CPT | Performed by: NURSE PRACTITIONER

## 2023-01-06 PROCEDURE — 1126F AMNT PAIN NOTED NONE PRSNT: CPT | Performed by: NURSE PRACTITIONER

## 2023-01-06 NOTE — PROGRESS NOTES
The ABCs of the Annual Wellness Visit  Subsequent Medicare Wellness Visit    Subjective      Eze Jauregui is a 87 y.o. male who presents for a Subsequent Medicare Wellness Visit.    The following portions of the patient's history were reviewed and   updated as appropriate: allergies, current medications, past family history, past medical history, past social history, past surgical history and problem list.    Compared to one year ago, the patient feels his physical   health is the same.    Compared to one year ago, the patient feels his mental   health is the same.    Recent Hospitalizations:  He was not admitted to the hospital during the last year.       Current Medical Providers:  Patient Care Team:  Edel Chowdary APRN as PCP - General (Nurse Practitioner)  Star Solano MD (Hematology and Oncology)  Elizabeth Saha MD (Dermatology)    Outpatient Medications Prior to Visit   Medication Sig Dispense Refill   • ferrous sulfate (FerrouSul) 325 (65 FE) MG tablet Take 1 tablet by mouth Daily With Breakfast. 90 tablet 0   • levothyroxine (SYNTHROID, LEVOTHROID) 50 MCG tablet Take 50 mcg by mouth Every Morning Before Breakfast.     • LORazepam (ATIVAN) 0.5 MG tablet Take 1 tablet by mouth Every 8 (Eight) Hours As Needed for Anxiety. 5 tablet 0   • omeprazole (priLOSEC) 40 MG capsule Take 40 mg by mouth Daily.       No facility-administered medications prior to visit.       No opioid medication identified on active medication list. I have reviewed chart for other potential  high risk medication/s and harmful drug interactions in the elderly.          Aspirin is not on active medication list.  Aspirin use is not indicated based on review of current medical condition/s. Risk of harm outweighs potential benefits.  .    Patient Active Problem List   Diagnosis   • Hypothyroidism   • Leukemia (HCC)   • Anxiety   • Multiple myeloma with failed remission (HCC)   • Anemia     Advance Care Planning  Advance  Directive is not on file.  ACP discussion was held with the patient during this visit. Patient has an advance directive (not in EMR), copy requested.     Objective    Vitals:    01/06/23 0953 01/06/23 1057   BP: 172/86 175/77   BP Location: Right arm Right arm   Patient Position: Sitting Sitting   Cuff Size: Adult Adult   Pulse: 59 59   Temp: 97.7 °F (36.5 °C)    TempSrc: Oral    SpO2: 97%    Weight: 58.4 kg (128 lb 12.8 oz)    Height: 172.7 cm (68\")      Estimated body mass index is 19.58 kg/m² as calculated from the following:    Height as of this encounter: 172.7 cm (68\").    Weight as of this encounter: 58.4 kg (128 lb 12.8 oz).    BMI is within normal parameters. No other follow-up for BMI required.      Does the patient have evidence of cognitive impairment?   No            HEALTH RISK ASSESSMENT    Smoking Status:  Social History     Tobacco Use   Smoking Status Never   Smokeless Tobacco Never     Alcohol Consumption:  Social History     Substance and Sexual Activity   Alcohol Use Not Currently     Fall Risk Screen:    STEADI Fall Risk Assessment was completed, and patient is at LOW risk for falls.Assessment completed on:1/6/2023    Depression Screening:  PHQ-2/PHQ-9 Depression Screening 1/6/2023   Little Interest or Pleasure in Doing Things 0-->not at all   Feeling Down, Depressed or Hopeless 0-->not at all   PHQ-9: Brief Depression Severity Measure Score 0       Health Habits and Functional and Cognitive Screening:  Functional & Cognitive Status 1/6/2023   Do you have difficulty preparing food and eating? No   Do you have difficulty bathing yourself, getting dressed or grooming yourself? No   Do you have difficulty using the toilet? No   Do you have difficulty moving around from place to place? No   Do you have trouble with steps or getting out of a bed or a chair? No   Current Diet Well Balanced Diet   Dental Exam Not up to date   Eye Exam Not up to date   Exercise (times per week) 5 times per week    Current Exercises Include Walking   Do you need help using the phone?  No   Are you deaf or do you have serious difficulty hearing?  No   Do you need help with transportation? No   Do you need help shopping? No   Do you need help preparing meals?  No   Do you need help with housework?  No   Do you need help with laundry? No   Do you need help taking your medications? No   Do you need help managing money? No   Do you ever drive or ride in a car without wearing a seat belt? No   Have you felt unusual stress, anger or loneliness in the last month? No   Who do you live with? Alone   If you need help, do you have trouble finding someone available to you? No   Have you been bothered in the last four weeks by sexual problems? No   Do you have difficulty concentrating, remembering or making decisions? No       Age-appropriate Screening Schedule:  Refer to the list below for future screening recommendations based on patient's age, sex and/or medical conditions. Orders for these recommended tests are listed in the plan section. The patient has been provided with a written plan.    Health Maintenance   Topic Date Due   • ZOSTER VACCINE (1 of 2) 01/06/2023 (Originally 10/2/1954)   • TDAP/TD VACCINES (1 - Tdap) 01/06/2024 (Originally 10/2/1954)   • INFLUENZA VACCINE  Completed                CMS Preventative Services Quick Reference  Risk Factors Identified During Encounter:    Hearing Problem: has hearing aids  Immunizations Discussed/Encouraged: Tdap, Prevnar 20 (Pneumococcal 20-valent conjugate), Shingrix and COVID19    The above risks/problems have been discussed with the patient.  Pertinent information has been shared with the patient in the After Visit Summary.    Diagnoses and all orders for this visit:    1. Medicare annual wellness visit, subsequent (Primary)  Comments:  health maintenance recommendations discussed, well balanced diet and exercise as tolerated, reduce sodium intake  , annual exam    2. Encounter for  immunization  -     Fluzone High-Dose 65+yrs (9212-9121)    3. Hypertension, unspecified type  Comments:  sodium reduction, monitor BP at home and follow up if remains elevated tdaoy         Follow Up:   Next Medicare Wellness visit to be scheduled in 1 year.      An After Visit Summary and PPPS were made available to the patient.

## 2023-06-05 ENCOUNTER — HOSPITAL ENCOUNTER (OUTPATIENT)
Dept: GENERAL RADIOLOGY | Facility: HOSPITAL | Age: 88
Discharge: HOME | End: 2023-06-05
Admitting: NURSE PRACTITIONER
Payer: MEDICARE

## 2023-06-05 ENCOUNTER — OFFICE VISIT (OUTPATIENT)
Dept: FAMILY MEDICINE CLINIC | Age: 88
End: 2023-06-05
Payer: MEDICARE

## 2023-06-05 VITALS
BODY MASS INDEX: 19.43 KG/M2 | SYSTOLIC BLOOD PRESSURE: 155 MMHG | DIASTOLIC BLOOD PRESSURE: 80 MMHG | WEIGHT: 128.2 LBS | TEMPERATURE: 98.2 F | HEART RATE: 69 BPM | HEIGHT: 68 IN | OXYGEN SATURATION: 94 %

## 2023-06-05 DIAGNOSIS — I10 HYPERTENSION, UNSPECIFIED TYPE: Primary | ICD-10-CM

## 2023-06-05 DIAGNOSIS — M54.2 NECK PAIN: ICD-10-CM

## 2023-06-05 DIAGNOSIS — C90.00 MULTIPLE MYELOMA WITH FAILED REMISSION: ICD-10-CM

## 2023-06-05 PROCEDURE — 1159F MED LIST DOCD IN RCRD: CPT | Performed by: NURSE PRACTITIONER

## 2023-06-05 PROCEDURE — 99214 OFFICE O/P EST MOD 30 MIN: CPT | Performed by: NURSE PRACTITIONER

## 2023-06-05 PROCEDURE — 1160F RVW MEDS BY RX/DR IN RCRD: CPT | Performed by: NURSE PRACTITIONER

## 2023-06-05 PROCEDURE — 72040 X-RAY EXAM NECK SPINE 2-3 VW: CPT

## 2023-06-05 RX ORDER — LISINOPRIL 5 MG/1
5 TABLET ORAL DAILY
Qty: 90 TABLET | Refills: 0 | Status: SHIPPED | OUTPATIENT
Start: 2023-06-05

## 2023-06-05 RX ORDER — METHOCARBAMOL 500 MG/1
500 TABLET, FILM COATED ORAL NIGHTLY PRN
Qty: 20 TABLET | Refills: 0 | Status: SHIPPED | OUTPATIENT
Start: 2023-06-05

## 2023-06-05 NOTE — PROGRESS NOTES
Chief Complaint  Eze Jauregui presents to [facility] for Neck Pain (X 1 week. No known injury. Pt states it popped when bending or stretching )      Subjective     History of Present Illness  Neck Pain: Paitent complains of neck pain. Event that precipitate these symptoms: injured while washing face. Onset of symptoms 1 week ago, unchanged since that time. Current symptoms are stiffness in neck . Patient denies pain in neck (aching and dull in character Patient has had no prior neck problems.  Previous treatments include: none pain.  He does have a a current diagnosis of multiple myeloma treated with Dr. Solano - He generally would get bone scan every 6 months but has not gotten this in quite some time.  He reports Dr. Solano recently changed him from every 3-month to every 6-month follow-up.  He has not been on chemotherapy for over a year now.  I do not have any access to any previous imaging in our records at this time but has been requested.    Eze  presents today with concerns over findings of elevated blood pressure readings.  Readings have been systolic 140-160s, systolic 160 or greater, and diastolic upper 80-90s at Dr. Solano's office appointments.  This has been going on for several years.  Reported symptoms include none.   Eze  has been on medication in the past.  Family history is positive  for hypertension.  Personal risk factors for cardiovascular disease include:  advanced age (older than 55 for men, 65 for women), hypertension, male gender, and sedentary lifestyle.  Eze has not tried lifestyle modifications to help lower blood pressure and is willing to start medication at this time but is skeptical and wants to start at a very low dose to avoid side effects.            Assessment and Plan       Diagnoses and all orders for this visit:    1. Hypertension, unspecified type (Primary)  Comments:  Start a very low-dose of lisinopril.  We will have him  "follow-up in 4 weeks at his next scheduled appointment  Orders:  -     lisinopril (PRINIVIL,ZESTRIL) 5 MG tablet; Take 1 tablet by mouth Daily.  Dispense: 90 tablet; Refill: 0    2. Neck pain  Comments:  We will get an x-ray given his history of multiple myeloma and acute onset of pain .  Advised to take Tylenol we will give a muscle relaxer w strict precautions  Orders:  -     XR Spine Cervical 2 or 3 View; Future  -     methocarbamol (ROBAXIN) 500 MG tablet; Take 1 tablet by mouth At Night As Needed for Muscle Spasms.  Dispense: 20 tablet; Refill: 0    3. Multiple myeloma with failed remission  Comments:  We will check x-ray of C-spine for any lytic lesions or fractures that may be related to his underlying myeloma        Follow Up   Return in about 4 weeks (around 7/3/2023) for Recheck, Medicare Wellness, Pending imaging results.      New Medications Ordered This Visit   Medications    lisinopril (PRINIVIL,ZESTRIL) 5 MG tablet     Sig: Take 1 tablet by mouth Daily.     Dispense:  90 tablet     Refill:  0    methocarbamol (ROBAXIN) 500 MG tablet     Sig: Take 1 tablet by mouth At Night As Needed for Muscle Spasms.     Dispense:  20 tablet     Refill:  0       There are no discontinued medications.         Review of Systems    Objective     Vitals:    06/05/23 1249   BP: 155/80   BP Location: Left arm   Patient Position: Sitting   Cuff Size: Adult   Pulse: 69   Temp: 98.2 °F (36.8 °C)   TempSrc: Oral   SpO2: 94%   Weight: 58.2 kg (128 lb 3.2 oz)   Height: 172.7 cm (68\")     Body mass index is 19.49 kg/m².     Physical Exam  Vitals reviewed.   Constitutional:       Appearance: Normal appearance.   HENT:      Head: Normocephalic.      Mouth/Throat:      Mouth: Mucous membranes are moist.      Pharynx: Oropharynx is clear.   Eyes:      Conjunctiva/sclera: Conjunctivae normal.   Cardiovascular:      Rate and Rhythm: Normal rate and regular rhythm.      Heart sounds: No murmur heard.  Pulmonary:      Effort: Pulmonary " effort is normal.      Breath sounds: Normal breath sounds.   Abdominal:      General: Bowel sounds are normal.      Tenderness: There is no abdominal tenderness.   Musculoskeletal:         General: Normal range of motion.      Cervical back: Normal range of motion. Crepitus present. Pain with movement and muscular tenderness present. Decreased range of motion.   Skin:     General: Skin is warm and dry.   Neurological:      General: No focal deficit present.      Mental Status: He is alert. Mental status is at baseline.   Psychiatric:         Mood and Affect: Mood normal.         Behavior: Behavior normal.         Thought Content: Thought content normal.          Result Review                       No Known Allergies   History reviewed. No pertinent past medical history.  Current Outpatient Medications   Medication Sig Dispense Refill    levothyroxine (SYNTHROID, LEVOTHROID) 50 MCG tablet Take 1 tablet by mouth Every Morning Before Breakfast.      ferrous sulfate (FerrouSul) 325 (65 FE) MG tablet Take 1 tablet by mouth Daily With Breakfast. (Patient not taking: Reported on 6/5/2023) 90 tablet 0    lisinopril (PRINIVIL,ZESTRIL) 5 MG tablet Take 1 tablet by mouth Daily. 90 tablet 0    LORazepam (ATIVAN) 0.5 MG tablet Take 1 tablet by mouth Every 8 (Eight) Hours As Needed for Anxiety. (Patient not taking: Reported on 6/5/2023) 5 tablet 0    methocarbamol (ROBAXIN) 500 MG tablet Take 1 tablet by mouth At Night As Needed for Muscle Spasms. 20 tablet 0    omeprazole (priLOSEC) 40 MG capsule Take 40 mg by mouth Daily.       No current facility-administered medications for this visit.     History reviewed. No pertinent surgical history.   Health Maintenance Due   Topic Date Due    ZOSTER VACCINE (1 of 2) Never done      Immunization History   Administered Date(s) Administered    COVID-19 ([employer]) 03/08/2021    Flu Vaccine Quad PF >36MO 11/16/2021    Flu Vaccine Split Quad 11/16/2021    Fluzone High-Dose 65+yrs 01/06/2023     Influenza Quad Vaccine (Inpatient) 11/13/2013         Part of this note may be an electronic transcription/translation of spoken language to printed   text using the Dragon Dictation System.      Edel Chowdary, APRN

## 2023-11-21 DIAGNOSIS — I10 HYPERTENSION, UNSPECIFIED TYPE: ICD-10-CM

## 2023-11-28 RX ORDER — LISINOPRIL 5 MG/1
5 TABLET ORAL DAILY
Qty: 90 TABLET | Refills: 1 | Status: SHIPPED | OUTPATIENT
Start: 2023-11-28 | End: 2023-11-29 | Stop reason: SDUPTHER

## 2023-11-29 ENCOUNTER — OFFICE VISIT (OUTPATIENT)
Dept: FAMILY MEDICINE CLINIC | Age: 88
End: 2023-11-29
Payer: MEDICARE

## 2023-11-29 VITALS
DIASTOLIC BLOOD PRESSURE: 80 MMHG | OXYGEN SATURATION: 95 % | SYSTOLIC BLOOD PRESSURE: 129 MMHG | BODY MASS INDEX: 18.19 KG/M2 | WEIGHT: 120 LBS | HEIGHT: 68 IN | HEART RATE: 75 BPM

## 2023-11-29 DIAGNOSIS — I10 HYPERTENSION, UNSPECIFIED TYPE: ICD-10-CM

## 2023-11-29 DIAGNOSIS — Z23 ENCOUNTER FOR IMMUNIZATION: ICD-10-CM

## 2023-11-29 DIAGNOSIS — J18.9 COMMUNITY ACQUIRED PNEUMONIA OF LEFT LOWER LOBE OF LUNG: ICD-10-CM

## 2023-11-29 DIAGNOSIS — F41.1 GENERALIZED ANXIETY DISORDER: Primary | ICD-10-CM

## 2023-11-29 DIAGNOSIS — R63.4 WEIGHT LOSS: ICD-10-CM

## 2023-11-29 DIAGNOSIS — F32.9 REACTIVE DEPRESSION: ICD-10-CM

## 2023-11-29 PROCEDURE — 1160F RVW MEDS BY RX/DR IN RCRD: CPT | Performed by: NURSE PRACTITIONER

## 2023-11-29 PROCEDURE — G0008 ADMIN INFLUENZA VIRUS VAC: HCPCS | Performed by: NURSE PRACTITIONER

## 2023-11-29 PROCEDURE — 99214 OFFICE O/P EST MOD 30 MIN: CPT | Performed by: NURSE PRACTITIONER

## 2023-11-29 PROCEDURE — 1159F MED LIST DOCD IN RCRD: CPT | Performed by: NURSE PRACTITIONER

## 2023-11-29 PROCEDURE — 90662 IIV NO PRSV INCREASED AG IM: CPT | Performed by: NURSE PRACTITIONER

## 2023-11-29 RX ORDER — OMEPRAZOLE 40 MG/1
40 CAPSULE, DELAYED RELEASE ORAL DAILY
COMMUNITY

## 2023-11-29 RX ORDER — LEVOFLOXACIN 750 MG/1
750 TABLET, FILM COATED ORAL DAILY
COMMUNITY
Start: 2023-11-24 | End: 2023-11-29

## 2023-11-29 RX ORDER — LISINOPRIL 5 MG/1
5 TABLET ORAL DAILY
Qty: 90 TABLET | Refills: 1 | Status: SHIPPED | OUTPATIENT
Start: 2023-11-29

## 2023-11-29 RX ORDER — SERTRALINE HYDROCHLORIDE 25 MG/1
25 TABLET, FILM COATED ORAL DAILY
Qty: 90 TABLET | Refills: 1 | Status: SHIPPED | OUTPATIENT
Start: 2023-11-29

## 2023-11-29 NOTE — PROGRESS NOTES
Chief Complaint  Eze Jauregui presents to [facility] for Hypertension (F/u. )      Subjective     History of Present Illness  Eze presents for follow up on hypertension.  Compliance with medication : lisinopril (generic) as prescribed   Check of BP at home reported as well controlled.  No new concerns or problems to report.    Eze presents today for follow up on anxiety.   He reports that the current treatment is not working well at the current dose.  Current treatment includes :   nothing now but previously on Ativan    Current, ongoing symptoms include: insomnia, racing thoughts, picking .    He denies current suicidal and homicidal ideation.   Eze has previously tried Ativan without benefit or have experienced side effects.     He is here with his daughter and states that since the passing of his wife, he has been struggling with depression/anxiety from time to time.  He is especially struggling due to holidays    Eze recently went to ER and diagnosed with pneumonia.  He was treated with Levaquin and prednisone  He states he is doing better but still very fatigued. Not short of breath but tired and still coughing mostly at night            Assessment and Plan       Diagnoses and all orders for this visit:    1. Generalized anxiety disorder (Primary)  Comments:  will start on sertraline at low dose ;  discussed risks associated with long term use of Benzos and do not advise that as long term treatment option  Orders:  -     sertraline (Zoloft) 25 MG tablet; Take 1 tablet by mouth Daily.  Dispense: 90 tablet; Refill: 1    2. Reactive depression  Comments:  start sertraline  follow up if symptoms worsening or not improved  Orders:  -     sertraline (Zoloft) 25 MG tablet; Take 1 tablet by mouth Daily.  Dispense: 90 tablet; Refill: 1    3. Weight loss  Comments:  advised on intake- we can consider remeron to help with weight gain;  follow up in 4-6 weeks  sooner  "if problems  / further weight loss    4. Hypertension, unspecified type  Comments:  continue current treatment  Orders:  -     lisinopril (PRINIVIL,ZESTRIL) 5 MG tablet; Take 1 tablet by mouth Daily.  Dispense: 90 tablet; Refill: 1    5. Community acquired pneumonia of left lower lobe of lung  Comments:  antibiotic therapy completed -advised to deep breathe, stay active and RTO if symptoms begin to worsen    6. Encounter for immunization  -     Fluzone High-Dose 65+yrs ([id number])            Follow Up   Return in about 3 months (around 2/29/2024) for Medicare Wellness.      New Medications Ordered This Visit   Medications    sertraline (Zoloft) 25 MG tablet     Sig: Take 1 tablet by mouth Daily.     Dispense:  90 tablet     Refill:  1    lisinopril (PRINIVIL,ZESTRIL) 5 MG tablet     Sig: Take 1 tablet by mouth Daily.     Dispense:  90 tablet     Refill:  1       Medications Discontinued During This Encounter   Medication Reason    LORazepam (ATIVAN) 0.5 MG tablet Discontinued by another clinician    methocarbamol (ROBAXIN) 500 MG tablet *Therapy completed    ferrous sulfate (FerrouSul) 325 (65 FE) MG tablet Discontinued by another clinician    levoFLOXacin (LEVAQUIN) 750 MG tablet *Therapy completed    omeprazole (priLOSEC) 40 MG capsule *Therapy completed    lisinopril (PRINIVIL,ZESTRIL) 5 MG tablet Reorder          Review of Systems    Objective     Vitals:    11/29/23 1321   BP: 129/80   BP Location: Right arm   Patient Position: Sitting   Pulse: 75   SpO2: 95%   Weight: 54.4 kg (120 lb)   Height: 172.7 cm (67.99\")     Body mass index is 18.25 kg/m².   BMI is below normal parameters (malnutrition). Recommendations: feels like this isrealted to his recent illness- daughter is watching intke and will follow up if persists with weight loss      Physical Exam  Vitals reviewed.   Constitutional:       Appearance: Normal appearance.   HENT:      Head: Normocephalic.   Eyes:      Pupils: Pupils are equal, round, and " reactive to light.   Cardiovascular:      Rate and Rhythm: Normal rate and regular rhythm.      Heart sounds: No murmur heard.  Pulmonary:      Effort: Pulmonary effort is normal.      Breath sounds: Normal breath sounds. No decreased breath sounds or wheezing.   Musculoskeletal:         General: Normal range of motion.   Neurological:      Mental Status: He is alert.   Psychiatric:         Mood and Affect: Mood normal.         Behavior: Behavior normal.            Result Review               No Known Allergies   History reviewed. No pertinent past medical history.  Current Outpatient Medications   Medication Sig Dispense Refill    levothyroxine (SYNTHROID, LEVOTHROID) 50 MCG tablet Take 1 tablet by mouth Every Morning Before Breakfast.      lisinopril (PRINIVIL,ZESTRIL) 5 MG tablet Take 1 tablet by mouth Daily. 90 tablet 1    omeprazole (priLOSEC) 40 MG capsule Take 1 capsule by mouth Daily. Indications: Gastroesophageal Reflux Disease      sertraline (Zoloft) 25 MG tablet Take 1 tablet by mouth Daily. 90 tablet 1     No current facility-administered medications for this visit.     History reviewed. No pertinent surgical history.   Health Maintenance Due   Topic Date Due    ZOSTER VACCINE (1 of 2) Never done      Immunization History   Administered Date(s) Administered    COVID-19 ([employer]) 03/08/2021    Flu Vaccine Quad PF >36MO 11/16/2021    Flu Vaccine Split Quad 11/16/2021    Fluzone High-Dose 65+yrs 01/06/2023, 11/29/2023    Influenza Quad Vaccine (Inpatient) 11/13/2013         Part of this note may be an electronic transcription/translation of spoken language to printed   text using the Dragon Dictation System.      SELENA Ivy

## 2024-05-29 ENCOUNTER — OFFICE VISIT (OUTPATIENT)
Dept: FAMILY MEDICINE CLINIC | Age: 89
End: 2024-05-29
Payer: MEDICARE

## 2024-05-29 ENCOUNTER — HOSPITAL ENCOUNTER (OUTPATIENT)
Dept: GENERAL RADIOLOGY | Facility: HOSPITAL | Age: 89
Discharge: HOME | End: 2024-05-29
Admitting: NURSE PRACTITIONER
Payer: MEDICARE

## 2024-05-29 VITALS
HEIGHT: 68 IN | SYSTOLIC BLOOD PRESSURE: 155 MMHG | OXYGEN SATURATION: 100 % | BODY MASS INDEX: 18.61 KG/M2 | DIASTOLIC BLOOD PRESSURE: 76 MMHG | HEART RATE: 60 BPM | TEMPERATURE: 97.5 F | WEIGHT: 122.8 LBS

## 2024-05-29 DIAGNOSIS — G47.01 INSOMNIA DUE TO MEDICAL CONDITION: ICD-10-CM

## 2024-05-29 DIAGNOSIS — R09.81 NASAL CONGESTION: ICD-10-CM

## 2024-05-29 DIAGNOSIS — R63.0 POOR APPETITE: ICD-10-CM

## 2024-05-29 DIAGNOSIS — R35.1 NOCTURIA: ICD-10-CM

## 2024-05-29 DIAGNOSIS — R06.00 DYSPNEA, UNSPECIFIED TYPE: ICD-10-CM

## 2024-05-29 DIAGNOSIS — R09.89 CHEST CONGESTION: Primary | ICD-10-CM

## 2024-05-29 DIAGNOSIS — R09.89 CHEST CONGESTION: ICD-10-CM

## 2024-05-29 LAB
EXPIRATION DATE: NORMAL
FLUAV AG UPPER RESP QL IA.RAPID: NOT DETECTED
FLUBV AG UPPER RESP QL IA.RAPID: NOT DETECTED
INTERNAL CONTROL: NORMAL
Lab: NORMAL
SARS-COV-2 AG UPPER RESP QL IA.RAPID: NOT DETECTED

## 2024-05-29 PROCEDURE — 1159F MED LIST DOCD IN RCRD: CPT | Performed by: NURSE PRACTITIONER

## 2024-05-29 PROCEDURE — 1160F RVW MEDS BY RX/DR IN RCRD: CPT | Performed by: NURSE PRACTITIONER

## 2024-05-29 PROCEDURE — 1125F AMNT PAIN NOTED PAIN PRSNT: CPT | Performed by: NURSE PRACTITIONER

## 2024-05-29 PROCEDURE — 87428 SARSCOV & INF VIR A&B AG IA: CPT | Performed by: NURSE PRACTITIONER

## 2024-05-29 PROCEDURE — 71046 X-RAY EXAM CHEST 2 VIEWS: CPT

## 2024-05-29 PROCEDURE — 99214 OFFICE O/P EST MOD 30 MIN: CPT | Performed by: NURSE PRACTITIONER

## 2024-05-29 RX ORDER — GUAIFENESIN 600 MG/1
1200 TABLET, EXTENDED RELEASE ORAL 2 TIMES DAILY
Qty: 28 TABLET | Refills: 0 | Status: SHIPPED | OUTPATIENT
Start: 2024-05-29 | End: 2024-06-05

## 2024-05-29 RX ORDER — MIRTAZAPINE 7.5 MG/1
7.5 TABLET, FILM COATED ORAL NIGHTLY
Qty: 30 TABLET | Refills: 1 | Status: SHIPPED | OUTPATIENT
Start: 2024-05-29

## 2024-05-29 RX ORDER — ALBUTEROL SULFATE 90 UG/1
2 AEROSOL, METERED RESPIRATORY (INHALATION) EVERY 4 HOURS PRN
Qty: 17 G | Refills: 0 | Status: SHIPPED | OUTPATIENT
Start: 2024-05-29

## 2024-05-29 RX ORDER — DOXYCYCLINE 100 MG/1
100 CAPSULE ORAL 2 TIMES DAILY
Qty: 14 CAPSULE | Refills: 0 | Status: SHIPPED | OUTPATIENT
Start: 2024-05-29 | End: 2024-06-05

## 2024-05-29 NOTE — PROGRESS NOTES
Chief Complaint  Eze Jauregui presents to [facility] for Hot Flashes (Sx started 2 weeks ago ), Fatigue, Nasal Congestion, Cough, and Fever (Sx started in the last few days )      Subjective     History of Present Illness    Eze  here today for concerns of URI symptoms      Known Exposure to positive case?  No  Date of exposure?  unknown date  Date of symptoms start?    14 days    (Symptoms may appear 2-14 days after exposure )    Fever or chills?  Yes, describe: off and on   comes and goes  Cough?   yes  Shortness of breath or difficulty breathing?  no  Fatigue?   yes  Muscle or body aches?  yes   Headache?   no  New loss of taste or smell? no  Sore throat?  yes  Congestion or runny nose? no  Nausea or vomiting?   no  Diarrhea?   no    Taking any medications at home to help with symptoms?Yes, describe: theraflu, tylenol   Any prior vaccine to covid?   yes  Any prior vaccine to flu this season? yes  Any significant health problems / existing lung / heart problems?  Yes, describe: multiple myeloma, history pneumonia     He has also been losing weight   feels like eats enough.  He does eat breakfast but not always other meals but will snack throughout the day.  He does reports that he feels like he is not getting good sleep but this is due to interruption secondary to his nocturia.  He has been evaluated in the past and told enlarged prostate and no further recommendations advised secondary to medication SE if he is still able to void.                Assessment and Plan       Diagnoses and all orders for this visit:    1. Chest congestion (Primary)  Comments:  will treat as a clinical pneumonia and will get CXR to determine if close follow up needed- advised mucines/ inhaler and f/u in office in 1-2 weeks if persist  Orders:  -     XR Chest PA & Lateral; Future  -     guaiFENesin (Mucinex) 600 MG 12 hr tablet; Take 2 tablets by mouth 2 (Two) Times a Day for 7 days.   Dispense: 28 tablet; Refill: 0  -     doxycycline (MONODOX) 100 MG capsule; Take 1 capsule by mouth 2 (Two) Times a Day for 7 days.  Dispense: 14 capsule; Refill: 0    2. Nasal congestion  Comments:  ER precautions given  Orders:  -     POCT SARS-CoV-2 Antigen VELVET + Flu    3. Poor appetite  -     mirtazapine (REMERON) 7.5 MG tablet; Take 1 tablet by mouth Every Night.  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 Nocturia  Comments:  declines new referral to urology  advised on lifestyle modifications to help minimize  Orders:  -     mirtazapine (REMERON) 7.5 MG tablet; Take 1 tablet by mouth Every Night.  Dispense: 30 tablet; Refill: 1    6. Dyspnea, unspecified type  Comments:  cxr today  Orders:  -     XR Chest PA & Lateral; Future  -     albuterol sulfate  (90 Base) MCG/ACT inhaler; Inhale 2 puffs Every 4 (Four) Hours As Needed for Wheezing.  Dispense: 17 g; Refill: 0            Follow Up   Return in about 2 weeks (around 6/12/2024) for Recheck.  Future Appointments   Date Time Provider Department Center   6/27/2024 11:30 AM Edel Chowdary APRN [facility]       New Medications Ordered This Visit   Medications    mirtazapine (REMERON) 7.5 MG tablet     Sig: Take 1 tablet by mouth Every Night.     Dispense:  30 tablet     Refill:  1    albuterol sulfate  (90 Base) MCG/ACT inhaler     Sig: Inhale 2 puffs Every 4 (Four) Hours As Needed for Wheezing.     Dispense:  17 g     Refill:  0    guaiFENesin (Mucinex) 600 MG 12 hr tablet     Sig: Take 2 tablets by mouth 2 (Two) Times a Day for 7 days.     Dispense:  28 tablet     Refill:  0    doxycycline (MONODOX) 100 MG capsule     Sig: Take 1 capsule by mouth 2 (Two) Times a Day for 7 days.     Dispense:  14 capsule     Refill:  0       There are no discontinued medications.       Review of Systems    Objective     Vitals:    05/29/24 1049   BP: 155/76   BP Location:  "Left arm   Patient Position: Sitting   Cuff Size: Adult   Pulse: 60   Temp: 97.5 °F (36.4 °C)   TempSrc: Oral   SpO2: 100%   Weight: 55.7 kg (122 lb 12.8 oz)   Height: 172.7 cm (67.99\")     Body mass index is 18.68 kg/m².   BMI is within normal parameters. No other follow-up for BMI required.      Physical Exam  Vitals reviewed.   Constitutional:       Appearance: Normal appearance. He is ill-appearing.   HENT:      Head: Normocephalic.   Eyes:      Pupils: Pupils are equal, round, and reactive to light.   Cardiovascular:      Rate and Rhythm: Normal rate and regular rhythm.      Heart sounds: No murmur heard.  Pulmonary:      Effort: Pulmonary effort is normal.      Breath sounds: Examination of the right-upper field reveals wheezing. Examination of the left-upper field reveals wheezing. Wheezing and rhonchi (diffuse) present.   Musculoskeletal:         General: Normal range of motion.   Neurological:      Mental Status: He is alert.   Psychiatric:         Mood and Affect: Mood normal.         Behavior: Behavior normal.            Result Review               No Known Allergies   History reviewed. No pertinent past medical history.  Current Outpatient Medications   Medication Sig Dispense Refill    levothyroxine (SYNTHROID, LEVOTHROID) 50 MCG tablet Take 1 tablet by mouth Every Morning Before Breakfast.      lisinopril (PRINIVIL,ZESTRIL) 5 MG tablet Take 1 tablet by mouth Daily. 90 tablet 1    omeprazole (priLOSEC) 40 MG capsule Take 1 capsule by mouth Daily. Indications: Gastroesophageal Reflux Disease      sertraline (Zoloft) 25 MG tablet Take 1 tablet by mouth Daily. 90 tablet 1    albuterol sulfate  (90 Base) MCG/ACT inhaler Inhale 2 puffs Every 4 (Four) Hours As Needed for Wheezing. 17 g 0    doxycycline (MONODOX) 100 MG capsule Take 1 capsule by mouth 2 (Two) Times a Day for 7 days. 14 capsule 0    guaiFENesin (Mucinex) 600 MG 12 hr tablet Take 2 tablets by mouth 2 (Two) Times a Day for 7 days. 28 " tablet 0    mirtazapine (REMERON) 7.5 MG tablet Take 1 tablet by mouth Every Night. 30 tablet 1     No current facility-administered medications for this visit.     History reviewed. No pertinent surgical history.   Health Maintenance Due   Topic Date Due    Pneumococcal Vaccine 65+ (1 of 2 - PCV) Never done    TDAP/TD VACCINES (1 - Tdap) Never done    ZOSTER VACCINE (1 of 2) Never done    COVID-19 Vaccine (2 - [place] risk series) 04/05/2021    ANNUAL WELLNESS VISIT  01/06/2024      Immunization History   Administered Date(s) Administered    Arexvy (RSV, Adults 60+ yrs) 01/22/2024    COVID-19 ([employer]) 03/08/2021    Flu Vaccine Quad PF >36MO 11/16/2021    Flu Vaccine Split Quad 11/16/2021    Fluzone High-Dose 65+yrs 01/06/2023, 11/29/2023    Influenza Quad Vaccine (Inpatient) 11/13/2013         Part of this note may be an electronic transcription/translation of spoken language to printed   text using the Dragon Dictation System.      SELENA Ivy

## 2024-05-31 ENCOUNTER — TELEPHONE (OUTPATIENT)
Dept: FAMILY MEDICINE CLINIC | Age: 89
End: 2024-05-31
Payer: MEDICARE

## 2024-08-26 DIAGNOSIS — I10 HYPERTENSION, UNSPECIFIED TYPE: ICD-10-CM

## 2024-08-26 RX ORDER — LISINOPRIL 5 MG/1
5 TABLET ORAL DAILY
Qty: 90 TABLET | Refills: 0 | Status: SHIPPED | OUTPATIENT
Start: 2024-08-26

## 2024-09-10 ENCOUNTER — OFFICE VISIT (OUTPATIENT)
Dept: FAMILY MEDICINE CLINIC | Age: 89
End: 2024-09-10
Payer: MEDICARE

## 2024-09-10 VITALS
OXYGEN SATURATION: 97 % | WEIGHT: 130.8 LBS | DIASTOLIC BLOOD PRESSURE: 83 MMHG | BODY MASS INDEX: 19.82 KG/M2 | HEART RATE: 61 BPM | SYSTOLIC BLOOD PRESSURE: 183 MMHG | HEIGHT: 68 IN | TEMPERATURE: 97.5 F

## 2024-09-10 DIAGNOSIS — C90.00 MULTIPLE MYELOMA WITH FAILED REMISSION: ICD-10-CM

## 2024-09-10 DIAGNOSIS — I10 HYPERTENSION, UNSPECIFIED TYPE: ICD-10-CM

## 2024-09-10 DIAGNOSIS — R35.1 NOCTURIA: ICD-10-CM

## 2024-09-10 DIAGNOSIS — Z00.00 MEDICARE ANNUAL WELLNESS VISIT, SUBSEQUENT: Primary | ICD-10-CM

## 2024-09-10 NOTE — PROGRESS NOTES
Subjective   The ABCs of the Annual Wellness Visit  Medicare Wellness Visit      Eze Jaurgeui is a 88 y.o. patient who presents for a Medicare Wellness Visit.    The following portions of the patient's history were reviewed and   updated as appropriate: allergies, current medications, past family history, past medical history, past social history, past surgical history, and problem list.    Compared to one year ago, the patient's physical   health is the same.  Compared to one year ago, the patient's mental   health is the same.    Recent Hospitalizations:  He was not admitted to the hospital during the last year.     Current Medical Providers:  Patient Care Team:  Edel Chowdary APRN as PCP - General (Nurse Practitioner)  Elizabeth Saha MD (Dermatology)  Mitesh Wan MD as Consulting Physician (Hematology and Oncology)    Outpatient Medications Prior to Visit   Medication Sig Dispense Refill    levothyroxine (SYNTHROID, LEVOTHROID) 50 MCG tablet Take 1 tablet by mouth Every Morning Before Breakfast.      lisinopril (PRINIVIL,ZESTRIL) 5 MG tablet TAKE 1 TABLET BY MOUTH ONCE DAILY 90 tablet 0    mirtazapine (REMERON) 7.5 MG tablet Take 1 tablet by mouth Every Night. 30 tablet 1    omeprazole (priLOSEC) 40 MG capsule Take 1 capsule by mouth Daily. Indications: Gastroesophageal Reflux Disease      albuterol sulfate  (90 Base) MCG/ACT inhaler Inhale 2 puffs Every 4 (Four) Hours As Needed for Wheezing. (Patient not taking: Reported on 9/10/2024) 17 g 0    sertraline (Zoloft) 25 MG tablet Take 1 tablet by mouth Daily. (Patient not taking: Reported on 9/10/2024) 90 tablet 1     No facility-administered medications prior to visit.     No opioid medication identified on active medication list. I have reviewed chart for other potential  high risk medication/s and harmful drug interactions in the elderly.      Aspirin is not on active medication list.  Aspirin use is not indicated based on review of  "current medical condition/s. Risk of harm outweighs potential benefits.  .    Patient Active Problem List   Diagnosis    Hypothyroidism    Leukemia    Anxiety    Multiple myeloma with failed remission    Anemia     Advance Care Planning Advance Directive is not on file.  ACP discussion was held with the patient during this visit. Patient has an advance directive (not in EMR), copy requested.            Objective   Vitals:    09/10/24 1402 09/10/24 1451   BP: (!) 189/87 (!) 183/83   BP Location: Left arm Left arm   Patient Position: Sitting Sitting   Cuff Size: Adult Adult   Pulse: 65 61   Temp: 97.5 °F (36.4 °C)    TempSrc: Oral    SpO2: 96% 97%   Weight: 59.3 kg (130 lb 12.8 oz)    Height: 172.7 cm (67.99\")        Estimated body mass index is 19.89 kg/m² as calculated from the following:    Height as of this encounter: 172.7 cm (67.99\").    Weight as of this encounter: 59.3 kg (130 lb 12.8 oz).    BMI is within normal parameters. No other follow-up for BMI required.       Does the patient have evidence of cognitive impairment? No                                                                                                Health  Risk Assessment    Smoking Status:  Social History     Tobacco Use   Smoking Status Never   Smokeless Tobacco Never     Alcohol Consumption:  Social History     Substance and Sexual Activity   Alcohol Use Not Currently       Fall Risk Screen  STEADI Fall Risk Assessment was completed, and patient is at LOW risk for falls.Assessment completed on:9/10/2024    Depression Screenin/10/2024     2:10 PM   PHQ-2/PHQ-9 Depression Screening   Little Interest or Pleasure in Doing Things 0-->not at all   Feeling Down, Depressed or Hopeless 0-->not at all   PHQ-9: Brief Depression Severity Measure Score 0     Health Habits and Functional and Cognitive Screenin/10/2024     2:10 PM   Functional & Cognitive Status   Do you have difficulty preparing food and eating? No   Do you have " difficulty bathing yourself, getting dressed or grooming yourself? No   Do you have difficulty using the toilet? No   Do you have difficulty moving around from place to place? No   Do you have trouble with steps or getting out of a bed or a chair? No   Current Diet Well Balanced Diet   Dental Exam Not up to date   Eye Exam Not up to date   Exercise (times per week) 3 times per week   Current Exercises Include Walking   Do you need help using the phone?  No   Are you deaf or do you have serious difficulty hearing?  Yes   Do you need help to go to places out of walking distance? No   Do you need help shopping? No   Do you need help preparing meals?  No   Do you need help with housework?  No   Do you need help with laundry? No   Do you need help taking your medications? No   Do you need help managing money? No   Do you ever drive or ride in a car without wearing a seat belt? No   Have you felt unusual stress, anger or loneliness in the last month? No   Who do you live with? Alone   If you need help, do you have trouble finding someone available to you? No   Have you been bothered in the last four weeks by sexual problems? No   Do you have difficulty concentrating, remembering or making decisions? No           Age-appropriate Screening Schedule:  Refer to the list below for future screening recommendations based on patient's age, sex and/or medical conditions. Orders for these recommended tests are listed in the plan section. The patient has been provided with a written plan.    Health Maintenance List  Health Maintenance   Topic Date Due    ZOSTER VACCINE (1 of 2) Never done    COVID-19 Vaccine (2 - Teddy risk series) 04/05/2021    INFLUENZA VACCINE  08/01/2024    TDAP/TD VACCINES (1 - Tdap) 09/10/2025 (Originally 10/2/1954)    Pneumococcal Vaccine 65+ (1 of 2 - PCV) 09/10/2026 (Originally 10/2/1941)    ANNUAL WELLNESS VISIT  09/10/2025    RSV Vaccine - Adults  Completed                                                                                                                                                 CMS Preventative Services Quick Reference  Risk Factors Identified During Encounter  Vision Screening Recommended    The above risks/problems have been discussed with the patient.  Pertinent information has been shared with the patient in the After Visit Summary.  An After Visit Summary and PPPS were made available to the patient.    Follow Up:   Next Medicare Wellness visit to be scheduled in 1 year.         Additional E&M Note during same encounter follows:  Patient has additional, significant, and separately identifiable condition(s)/problem(s) that require work above and beyond the Medicare Wellness Visit     Chief Complaint  Medicare Wellness-subsequent, Anxiety, and Hypothyroidism    Subjective   HPI  Eze is also being seen today for additional medical problem/s.        Eze presents for follow up on hypothyroidism.   has not been taking medication as prescribed.    Medication includes :  levothyroxine  50 mcg  Current symptoms include:  fatigue.   Last   Lab Results   Component Value Date    TSH 2.532 12/04/2023       Eze presents today for follow up on Hypertension.    Current medication / treatment includes lisinopril (generic), and is compliant with medications.   no side effects reported.    Home blood pressure monitoring readings reported as home BP checks: It is well controlled when checked.  Medication changes are not recommended at this time.    Last appt discussed weight loss and need to improve sleep.  He states that he may sleep a little better, but overall, still on ly 3-4 hours at a time.  He feels that much of this is secondary to urinary symptoms    He continues to follow up with oncology for multiple myeloma. He will follow up there routinely every 3 months          Objective   Vital Signs:  BP (!) 183/83 (BP Location: Left arm, Patient Position: Sitting, Cuff Size: Adult)   Pulse 61    "Temp 97.5 °F (36.4 °C) (Oral)   Ht 172.7 cm (67.99\")   Wt 59.3 kg (130 lb 12.8 oz)   SpO2 97%   BMI 19.89 kg/m²   Physical Exam  Vitals reviewed.   Constitutional:       Appearance: Normal appearance.   HENT:      Head: Normocephalic.   Eyes:      Pupils: Pupils are equal, round, and reactive to light.   Cardiovascular:      Rate and Rhythm: Normal rate and regular rhythm.      Heart sounds: No murmur heard.  Pulmonary:      Effort: Pulmonary effort is normal.      Breath sounds: Normal breath sounds.   Musculoskeletal:         General: Normal range of motion.   Neurological:      Mental Status: He is alert.   Psychiatric:         Mood and Affect: Mood normal.         Behavior: Behavior normal.                 Assessment and Plan               Medicare annual wellness visit, subsequent    Hypertension, unspecified type    Nocturia    Multiple myeloma with failed remission      Orders Placed This Encounter   Procedures    Comprehensive metabolic panel     Standing Status:   Future     Standing Expiration Date:   9/10/2025     Order Specific Question:   Release to patient     Answer:   Routine Release [[id number]]    CBC No Differential     Standing Status:   Future     Standing Expiration Date:   9/10/2025     Order Specific Question:   Release to patient     Answer:   Routine Release [[id number]]     New Medications Ordered This Visit   Medications    tamsulosin (FLOMAX) 0.4 MG capsule 24 hr capsule     Sig: Take 1 capsule by mouth Daily.     Dispense:  30 capsule     Refill:  1          Follow Up   Return in about 6 months (around 3/10/2025) for Recheck.  Patient was given instructions and counseling regarding his condition or for health maintenance advice. Please see specific information pulled into the AVS if appropriate.  "

## 2024-09-14 RX ORDER — TAMSULOSIN HYDROCHLORIDE 0.4 MG/1
1 CAPSULE ORAL DAILY
Qty: 30 CAPSULE | Refills: 1 | Status: SHIPPED | OUTPATIENT
Start: 2024-09-14

## 2024-09-18 ENCOUNTER — TELEPHONE (OUTPATIENT)
Dept: FAMILY MEDICINE CLINIC | Age: 89
End: 2024-09-18
Payer: MEDICARE

## 2024-09-26 ENCOUNTER — TELEPHONE (OUTPATIENT)
Dept: FAMILY MEDICINE CLINIC | Age: 89
End: 2024-09-26
Payer: MEDICARE

## 2024-09-26 DIAGNOSIS — I10 HYPERTENSION, UNSPECIFIED TYPE: ICD-10-CM

## 2024-09-26 RX ORDER — LISINOPRIL 10 MG/1
10 TABLET ORAL DAILY
Qty: 90 TABLET | Refills: 0 | Status: SHIPPED | OUTPATIENT
Start: 2024-09-26

## 2024-12-10 ENCOUNTER — OFFICE VISIT (OUTPATIENT)
Dept: FAMILY MEDICINE CLINIC | Age: 89
End: 2024-12-10
Payer: MEDICARE

## 2024-12-10 VITALS
WEIGHT: 120.8 LBS | SYSTOLIC BLOOD PRESSURE: 166 MMHG | TEMPERATURE: 97.6 F | BODY MASS INDEX: 18.31 KG/M2 | HEART RATE: 61 BPM | OXYGEN SATURATION: 98 % | HEIGHT: 68 IN | DIASTOLIC BLOOD PRESSURE: 75 MMHG

## 2024-12-10 DIAGNOSIS — J06.9 UPPER RESPIRATORY TRACT INFECTION, UNSPECIFIED TYPE: ICD-10-CM

## 2024-12-10 DIAGNOSIS — R09.81 NASAL CONGESTION: Primary | ICD-10-CM

## 2024-12-10 PROCEDURE — 1160F RVW MEDS BY RX/DR IN RCRD: CPT | Performed by: NURSE PRACTITIONER

## 2024-12-10 PROCEDURE — 99213 OFFICE O/P EST LOW 20 MIN: CPT | Performed by: NURSE PRACTITIONER

## 2024-12-10 PROCEDURE — 87428 SARSCOV & INF VIR A&B AG IA: CPT | Performed by: NURSE PRACTITIONER

## 2024-12-10 PROCEDURE — 1125F AMNT PAIN NOTED PAIN PRSNT: CPT | Performed by: NURSE PRACTITIONER

## 2024-12-10 PROCEDURE — 1159F MED LIST DOCD IN RCRD: CPT | Performed by: NURSE PRACTITIONER

## 2024-12-10 RX ORDER — AZITHROMYCIN 250 MG/1
TABLET, FILM COATED ORAL
Qty: 6 TABLET | Refills: 0 | Status: SHIPPED | OUTPATIENT
Start: 2024-12-10

## 2024-12-10 NOTE — PROGRESS NOTES
"Chief Complaint  Fatigue (Pt c/o feeling tired), Diarrhea (X4 days, body aches, chills./), and Nasal Congestion (Pt c/o runny nose x3 days)    Subjective          Eze Jauregui presents to [facility]  History of Present Illness  He is here with his daughter, Laurence.    His symptoms have been present for a little over a week (December 1). His main complaint is fatigue. His symptoms started with cold chills. He is having rhinorrhea and nasal congestion. His throat is scratchy. He denies having a rash. He denies cough and urinary symptoms. He does have diarrhea, but no nausea or vomiting. His diarrhea has resolved. Pepto-Bismo has helped. No known sick contacts.       Objective   Vital Signs:   /75 (BP Location: Left arm, Patient Position: Sitting)   Pulse 61   Temp 97.6 °F (36.4 °C) (Oral)   Ht 172.7 cm (67.99\")   Wt 54.8 kg (120 lb 12.8 oz)   SpO2 98% Comment: room air  BMI 18.37 kg/m²     Physical Exam  Constitutional:       Appearance: Normal appearance. He is normal weight.   HENT:      Head: Normocephalic.      Right Ear: Tympanic membrane, ear canal and external ear normal.      Left Ear: Tympanic membrane, ear canal and external ear normal.      Nose: Nose normal.      Right Sinus: No maxillary sinus tenderness or frontal sinus tenderness.      Left Sinus: No maxillary sinus tenderness or frontal sinus tenderness.      Mouth/Throat:      Mouth: Mucous membranes are moist.      Pharynx: Oropharynx is clear. No oropharyngeal exudate or posterior oropharyngeal erythema.   Eyes:      Conjunctiva/sclera: Conjunctivae normal.      Pupils: Pupils are equal, round, and reactive to light.   Cardiovascular:      Rate and Rhythm: Normal rate and regular rhythm.      Pulses: Normal pulses.      Heart sounds: Normal heart sounds.   Pulmonary:      Effort: Pulmonary effort is normal. No respiratory distress.      Breath sounds: Normal breath sounds. No wheezing, rhonchi or " rales.   Musculoskeletal:      Cervical back: Normal range of motion.   Neurological:      Mental Status: He is alert and oriented to person, place, and time.   Psychiatric:         Mood and Affect: Mood normal.         Behavior: Behavior normal.         Thought Content: Thought content normal.        Result Review :   The following data was reviewed by: SELENA Rodriguez on 12/10/2024:                  Assessment and Plan    Diagnoses and all orders for this visit:    1. Nasal congestion (Primary)  -     POCT SARS-CoV-2 Antigen VELVET + Flu  -     azithromycin (Zithromax Z-Luciano) 250 MG tablet; Take 2 tablets by mouth on day 1, then 1 tablet daily on days 2-5  Dispense: 6 tablet; Refill: 0    2. Upper respiratory tract infection, unspecified type  -     azithromycin (Zithromax Z-Luciano) 250 MG tablet; Take 2 tablets by mouth on day 1, then 1 tablet daily on days 2-5  Dispense: 6 tablet; Refill: 0        BMI is below normal parameters (malnutrition). Recommendations: referral to primary care       Follow Up   Return if symptoms worsen or fail to improve.  Patient was given instructions and counseling regarding his condition or for health maintenance advice. Please see specific information pulled into the AVS if appropriate.

## 2025-02-14 DIAGNOSIS — I10 HYPERTENSION, UNSPECIFIED TYPE: ICD-10-CM

## 2025-02-14 RX ORDER — LISINOPRIL 10 MG/1
10 TABLET ORAL DAILY
Qty: 90 TABLET | Refills: 0 | Status: SHIPPED | OUTPATIENT
Start: 2025-02-14

## 2025-03-11 ENCOUNTER — OFFICE VISIT (OUTPATIENT)
Dept: FAMILY MEDICINE CLINIC | Age: 89
End: 2025-03-11
Payer: MEDICARE

## 2025-03-11 ENCOUNTER — LAB (OUTPATIENT)
Dept: LAB | Facility: HOSPITAL | Age: 89
End: 2025-03-11
Payer: MEDICARE

## 2025-03-11 VITALS
SYSTOLIC BLOOD PRESSURE: 190 MMHG | OXYGEN SATURATION: 98 % | TEMPERATURE: 97.6 F | WEIGHT: 124 LBS | DIASTOLIC BLOOD PRESSURE: 97 MMHG | HEART RATE: 66 BPM | BODY MASS INDEX: 18.79 KG/M2 | HEIGHT: 68 IN

## 2025-03-11 DIAGNOSIS — Z13.6 SCREENING FOR CARDIOVASCULAR CONDITION: ICD-10-CM

## 2025-03-11 DIAGNOSIS — Z87.19 HISTORY OF ESOPHAGEAL STRICTURE: ICD-10-CM

## 2025-03-11 DIAGNOSIS — E03.9 ACQUIRED HYPOTHYROIDISM: ICD-10-CM

## 2025-03-11 DIAGNOSIS — I10 HYPERTENSION, UNSPECIFIED TYPE: ICD-10-CM

## 2025-03-11 DIAGNOSIS — R13.10 DYSPHAGIA, UNSPECIFIED TYPE: Primary | ICD-10-CM

## 2025-03-11 DIAGNOSIS — C90.00 MULTIPLE MYELOMA WITH FAILED REMISSION: ICD-10-CM

## 2025-03-11 LAB
ALBUMIN SERPL-MCNC: 3.8 G/DL (ref 3.5–5.2)
ALBUMIN/GLOB SERPL: 0.8 G/DL
ALP SERPL-CCNC: 387 U/L (ref 39–117)
ALT SERPL W P-5'-P-CCNC: 27 U/L (ref 1–41)
ANION GAP SERPL CALCULATED.3IONS-SCNC: 10.3 MMOL/L (ref 5–15)
AST SERPL-CCNC: 44 U/L (ref 1–40)
BILIRUB SERPL-MCNC: 0.4 MG/DL (ref 0–1.2)
BUN SERPL-MCNC: 37 MG/DL (ref 8–23)
BUN/CREAT SERPL: 13.4 (ref 7–25)
CALCIUM SPEC-SCNC: 8.9 MG/DL (ref 8.6–10.5)
CHLORIDE SERPL-SCNC: 107 MMOL/L (ref 98–107)
CO2 SERPL-SCNC: 19.7 MMOL/L (ref 22–29)
CREAT SERPL-MCNC: 2.76 MG/DL (ref 0.76–1.27)
DEPRECATED RDW RBC AUTO: 51.5 FL (ref 37–54)
EGFRCR SERPLBLD CKD-EPI 2021: 21.5 ML/MIN/1.73
ERYTHROCYTE [DISTWIDTH] IN BLOOD BY AUTOMATED COUNT: 14.5 % (ref 12.3–15.4)
GLOBULIN UR ELPH-MCNC: 4.9 GM/DL
GLUCOSE SERPL-MCNC: 101 MG/DL (ref 65–99)
HCT VFR BLD AUTO: 37 % (ref 37.5–51)
HGB BLD-MCNC: 11.7 G/DL (ref 13–17.7)
MCH RBC QN AUTO: 30.3 PG (ref 26.6–33)
MCHC RBC AUTO-ENTMCNC: 31.6 G/DL (ref 31.5–35.7)
MCV RBC AUTO: 95.9 FL (ref 79–97)
PLATELET # BLD AUTO: 246 10*3/MM3 (ref 140–450)
PMV BLD AUTO: 10.1 FL (ref 6–12)
POTASSIUM SERPL-SCNC: 4.6 MMOL/L (ref 3.5–5.2)
PROT SERPL-MCNC: 8.7 G/DL (ref 6–8.5)
RBC # BLD AUTO: 3.86 10*6/MM3 (ref 4.14–5.8)
SODIUM SERPL-SCNC: 137 MMOL/L (ref 136–145)
TSH SERPL DL<=0.05 MIU/L-ACNC: 8.36 UIU/ML (ref 0.27–4.2)
WBC NRBC COR # BLD AUTO: 6.22 10*3/MM3 (ref 3.4–10.8)

## 2025-03-11 PROCEDURE — 1125F AMNT PAIN NOTED PAIN PRSNT: CPT | Performed by: NURSE PRACTITIONER

## 2025-03-11 PROCEDURE — 1160F RVW MEDS BY RX/DR IN RCRD: CPT | Performed by: NURSE PRACTITIONER

## 2025-03-11 PROCEDURE — 85027 COMPLETE CBC AUTOMATED: CPT

## 2025-03-11 PROCEDURE — 1159F MED LIST DOCD IN RCRD: CPT | Performed by: NURSE PRACTITIONER

## 2025-03-11 PROCEDURE — 99214 OFFICE O/P EST MOD 30 MIN: CPT | Performed by: NURSE PRACTITIONER

## 2025-03-11 PROCEDURE — 84443 ASSAY THYROID STIM HORMONE: CPT

## 2025-03-11 PROCEDURE — 36415 COLL VENOUS BLD VENIPUNCTURE: CPT

## 2025-03-11 PROCEDURE — 80053 COMPREHEN METABOLIC PANEL: CPT

## 2025-03-11 RX ORDER — LISINOPRIL 20 MG/1
20 TABLET ORAL DAILY
Qty: 90 TABLET | Refills: 1 | Status: SHIPPED | OUTPATIENT
Start: 2025-03-11

## 2025-03-11 NOTE — PROGRESS NOTES
Chief Complaint  Eze Jauregui presents to [facility] for Hypertension (6 month )    Subjective     History of Present Illness    Eze presents today for follow up on hypertension.    Current medication treatment includes Lisinopril 10 mg, and is compliant with medications.   Side effects reported :  No  Home blood pressure monitoring readings reported as home BP checks: It is well controlled when checked.  Medication changes are recommended at this time and patient is willing to make changes to current treatment plan.  We had previousl prescribed remeron for sleep and concerns over weight loss.  He did stop taking the medication but his weight appears to be improving.      His thyroid medication was managed by Dr. Wan    He is having some problems swallowing again.  Did have esophageal dilation in past and feels that this may be something he will need again soon.  He states he has not had much luck getting food down easily lately. He states that he has not choked, but is using much more caution.      Assessment and Plan     Diagnoses and all orders for this visit:    1. Dysphagia, unspecified type (Primary)  Comments:  referral to Dr. Kimball for EGD / recommendations  Orders:  -     Ambulatory Referral to General Surgery    2. History of esophageal stricture  -     Ambulatory Referral to General Surgery    3. Screening for cardiovascular condition  -     Basic metabolic panel; Future    4. Acquired hypothyroidism  Comments:  levels increased- medication changes recommended  Orders:  -     TSH; Future    5. Hypertension, unspecified type  Comments:  increase lisinopril f/u 2 weeks, monitor BP at home  Orders:  -     lisinopril (PRINIVIL,ZESTRIL) 20 MG tablet; Take 1 tablet by mouth Daily.  Dispense: 90 tablet; Refill: 1            Follow Up   Return in about 2 weeks (around 3/25/2025) for Recheck BP.  Future Appointments   Date Time Provider Department Center  "  3/25/2025 11:45 AM Edel Chowdary APRN [facility]   9/16/2025  1:15 PM Edel Chowdary APRN [facility]       New Medications Ordered This Visit   Medications    lisinopril (PRINIVIL,ZESTRIL) 20 MG tablet     Sig: Take 1 tablet by mouth Daily.     Dispense:  90 tablet     Refill:  1       Medications Discontinued During This Encounter   Medication Reason    azithromycin (Zithromax Z-Luciano) 250 MG tablet *Therapy completed    tamsulosin (FLOMAX) 0.4 MG capsule 24 hr capsule *Therapy completed    mirtazapine (REMERON) 7.5 MG tablet *Therapy completed    lisinopril (PRINIVIL,ZESTRIL) 10 MG tablet           Review of Systems    Objective     Vitals:    03/11/25 1135   BP: (!) 190/97   BP Location: Left arm   Patient Position: Sitting   Cuff Size: Adult   Pulse: 66   Temp: 97.6 °F (36.4 °C)   TempSrc: Oral   SpO2: 98%   Weight: 56.2 kg (124 lb)   Height: 172.7 cm (67.99\")         Physical Exam  Vitals reviewed.   Constitutional:       Appearance: Normal appearance.   HENT:      Head: Normocephalic.   Eyes:      Pupils: Pupils are equal, round, and reactive to light.   Cardiovascular:      Rate and Rhythm: Normal rate and regular rhythm.      Heart sounds: No murmur heard.  Pulmonary:      Effort: Pulmonary effort is normal.      Breath sounds: Normal breath sounds.   Musculoskeletal:         General: Normal range of motion.   Neurological:      Mental Status: He is alert.   Psychiatric:         Mood and Affect: Mood normal.         Behavior: Behavior normal.               Result Review        No Known Allergies   History reviewed. No pertinent past medical history.  Current Outpatient Medications   Medication Sig Dispense Refill    lisinopril (PRINIVIL,ZESTRIL) 20 MG tablet Take 1 tablet by mouth Daily. 90 tablet 1    omeprazole (priLOSEC) 40 MG capsule Take 1 capsule by mouth Daily. Indications: Gastroesophageal Reflux Disease      levothyroxine (Synthroid) 75 MCG tablet Take 1 tablet by mouth Every " Morning. 90 tablet 1     No current facility-administered medications for this visit.     History reviewed. No pertinent surgical history.   Health Maintenance Due   Topic Date Due    ZOSTER VACCINE (1 of 2) Never done    COVID-19 Vaccine (2 - Yadira risk series) 04/05/2021    INFLUENZA VACCINE  07/01/2024      Immunization History   Administered Date(s) Administered    Arexvy (RSV, Adults 60+ yrs) 01/22/2024    COVID-19 (YADIRA) 03/08/2021    Flu Vaccine Quad PF >36MO 11/16/2021    Flu Vaccine Split Quad 11/16/2021    Fluzone  >6mos 11/13/2013    Fluzone High-Dose 65+yrs 01/06/2023, 11/29/2023         Part of this note may be an electronic transcription/translation of spoken language to printed   text using the Dragon Dictation System.      SELENA Ivy

## 2025-03-17 DIAGNOSIS — E03.9 ACQUIRED HYPOTHYROIDISM: Primary | ICD-10-CM

## 2025-03-17 RX ORDER — LEVOTHYROXINE SODIUM 75 UG/1
75 TABLET ORAL
Qty: 90 TABLET | Refills: 1 | Status: SHIPPED | OUTPATIENT
Start: 2025-03-17

## 2025-03-25 ENCOUNTER — TELEPHONE (OUTPATIENT)
Dept: FAMILY MEDICINE CLINIC | Age: 89
End: 2025-03-25

## 2025-03-25 ENCOUNTER — HOSPITAL ENCOUNTER (OUTPATIENT)
Dept: ULTRASOUND IMAGING | Facility: HOSPITAL | Age: 89
Discharge: HOME | End: 2025-03-25
Admitting: NURSE PRACTITIONER
Payer: MEDICARE

## 2025-03-25 ENCOUNTER — OFFICE VISIT (OUTPATIENT)
Dept: FAMILY MEDICINE CLINIC | Age: 89
End: 2025-03-25
Payer: MEDICARE

## 2025-03-25 VITALS
SYSTOLIC BLOOD PRESSURE: 160 MMHG | DIASTOLIC BLOOD PRESSURE: 100 MMHG | HEIGHT: 68 IN | WEIGHT: 124.4 LBS | BODY MASS INDEX: 18.85 KG/M2 | HEART RATE: 68 BPM | TEMPERATURE: 97.9 F | OXYGEN SATURATION: 98 %

## 2025-03-25 DIAGNOSIS — R60.0 LOCALIZED EDEMA: Primary | ICD-10-CM

## 2025-03-25 DIAGNOSIS — I10 HYPERTENSION, UNSPECIFIED TYPE: ICD-10-CM

## 2025-03-25 DIAGNOSIS — R60.0 LOCALIZED EDEMA: ICD-10-CM

## 2025-03-25 PROCEDURE — 93971 EXTREMITY STUDY: CPT

## 2025-03-25 RX ORDER — LISINOPRIL 40 MG/1
40 TABLET ORAL DAILY
Qty: 30 TABLET | Refills: 1 | Status: SHIPPED | OUTPATIENT
Start: 2025-03-25

## 2025-03-25 NOTE — PROGRESS NOTES
Chief Complaint  Eze Jauregui presents to [facility] for Hypertension (2 wk f/u )    Subjective     History of Present Illness  Here with son Easton Jauregui and daughter, Laurence Qureshi  is on the phone  for follow up on his blood pressure  Last visit we increased his lisinopril from 10 mg to 20mg.  He is still running high.  He denies any symptoms of dizziness, light headed.  He does still have occasional HA but improved.  He has opted to avoid any further treatment for his leukemia from the oncologist.    He is having some edema in his right lower leg- this does improve with elevation, but they are concerned.  He does have some discomfort, but would not call this pain.      Assessment and Plan     Diagnoses and all orders for this visit:    1. Localized edema (Primary)  Comments:  will rule out DVT, advise on elevation and consider diuretic for improved BP control if renal function ok and duplex negative  Orders:  -     US Venous Doppler Lower Extremity Right (duplex); Future    2. Hypertension, unspecified type  Comments:  increase lisinopril to 40 mg and have him follow up in office in 4 weeks  Orders:  -     lisinopril (PRINIVIL,ZESTRIL) 40 MG tablet; Take 1 tablet by mouth Daily.  Dispense: 30 tablet; Refill: 1            Follow Up   Return in about 4 weeks (around 4/22/2025) for Recheck.  Future Appointments   Date Time Provider Department Center   4/22/2025 10:30 AM Edel Chowdary APRN [facility]   9/16/2025  1:15 PM [facility]       New Medications Ordered This Visit   Medications    lisinopril (PRINIVIL,ZESTRIL) 40 MG tablet     Sig: Take 1 tablet by mouth Daily.     Dispense:  30 tablet     Refill:  1       Medications Discontinued During This Encounter   Medication Reason    lisinopril (PRINIVIL,ZESTRIL) 20 MG tablet Dose adjustment          Review of Systems    Objective     Vitals:    03/25/25 1139 03/25/25 1222   BP: (!) 190/93  "160/100   BP Location: Right arm Right arm   Patient Position: Sitting Sitting   Cuff Size: Adult Adult   Pulse: 67 68   Temp: 97.9 °F (36.6 °C)    TempSrc: Oral    SpO2: 98%    Weight: 56.4 kg (124 lb 6.4 oz)    Height: 172.7 cm (67.99\")          Physical Exam  Vitals reviewed.   Constitutional:       Appearance: Normal appearance.   HENT:      Head: Normocephalic.   Eyes:      Pupils: Pupils are equal, round, and reactive to light.   Cardiovascular:      Rate and Rhythm: Normal rate and regular rhythm.      Heart sounds: No murmur heard.  Pulmonary:      Effort: Pulmonary effort is normal.      Breath sounds: Normal breath sounds.   Musculoskeletal:         General: Normal range of motion.   Neurological:      Mental Status: He is alert.   Psychiatric:         Mood and Affect: Mood normal.         Behavior: Behavior normal.               Result Review        No Known Allergies   History reviewed. No pertinent past medical history.  Current Outpatient Medications   Medication Sig Dispense Refill    levothyroxine (Synthroid) 75 MCG tablet Take 1 tablet by mouth Every Morning. 90 tablet 1    omeprazole (priLOSEC) 40 MG capsule Take 1 capsule by mouth Daily. Indications: Gastroesophageal Reflux Disease      lisinopril (PRINIVIL,ZESTRIL) 40 MG tablet Take 1 tablet by mouth Daily. 30 tablet 1     No current facility-administered medications for this visit.     History reviewed. No pertinent surgical history.   Health Maintenance Due   Topic Date Due    ZOSTER VACCINE (1 of 2) Never done    COVID-19 Vaccine (2 - Yadira risk series) 04/05/2021    LIPID PANEL  12/22/2021    INFLUENZA VACCINE  07/01/2024      Immunization History   Administered Date(s) Administered    Arexvy (RSV, Adults 60+ yrs) 01/22/2024    COVID-19 (YADIRA) 03/08/2021    Flu Vaccine Quad PF >36MO 11/16/2021    Flu Vaccine Split Quad 11/16/2021    Fluzone  >6mos 11/13/2013    Fluzone High-Dose 65+yrs 01/06/2023, 11/29/2023         Part of this note may " be an electronic transcription/translation of spoken language to printed   text using the Dragon Dictation System.      SELENA Ivy

## 2025-03-25 NOTE — TELEPHONE ENCOUNTER
Patient's daughter called unable to make it into appt with her father this morning but she wanted to know if Indu would call her during patient's appt so she can hear what all is discussed. Advised daughter I can only send a msg back regarding her request but cannot guarantee. If provider is able to do this please let her know.

## 2025-03-27 PROBLEM — K29.70 GASTRITIS: Status: ACTIVE | Noted: 2025-03-27

## 2025-03-27 PROBLEM — N18.30 CKD (CHRONIC KIDNEY DISEASE), STAGE III: Chronic | Status: ACTIVE | Noted: 2023-01-24

## 2025-03-27 PROBLEM — E78.00 HYPERCHOLESTEROLEMIA: Status: ACTIVE | Noted: 2025-03-27

## 2025-03-27 PROBLEM — E04.9 GOITER: Status: ACTIVE | Noted: 2025-03-27

## 2025-04-22 ENCOUNTER — OFFICE VISIT (OUTPATIENT)
Dept: FAMILY MEDICINE CLINIC | Age: 89
End: 2025-04-22
Payer: MEDICARE

## 2025-04-22 VITALS
WEIGHT: 124 LBS | HEIGHT: 68 IN | BODY MASS INDEX: 18.79 KG/M2 | SYSTOLIC BLOOD PRESSURE: 193 MMHG | OXYGEN SATURATION: 98 % | HEART RATE: 58 BPM | TEMPERATURE: 97.6 F | DIASTOLIC BLOOD PRESSURE: 92 MMHG

## 2025-04-22 DIAGNOSIS — I10 HYPERTENSION, UNSPECIFIED TYPE: Primary | ICD-10-CM

## 2025-04-22 RX ORDER — AMLODIPINE BESYLATE 5 MG/1
5 TABLET ORAL DAILY
Qty: 30 TABLET | Refills: 1 | Status: SHIPPED | OUTPATIENT
Start: 2025-04-22

## 2025-04-22 NOTE — PROGRESS NOTES
"Chief Complaint  Eze Jauregui presents to [facility] for Hypertension (4 wk f/u )    Subjective     Hypertension      Mr. Jauregui is in today with his daughter for follow-up on hypertension.  Last visit we did increase his lisinopril to 40 mg daily which he reports compliance.  His blood pressure is quite elevated today but reports that he had a \"very salty breakfast this morning\".  He does not check his blood pressures at home.  He denies any symptoms other than last couple of days he has felt a little more tired and fatigued but attributes this to his sleep disruption.    Assessment and Plan     Diagnoses and all orders for this visit:    1. Hypertension, unspecified type (Primary)  Comments:  We will continue the lisinopril 40 mg daily and add amlodipine 5 mg daily.  He will follow-up with us in 2 to 3 weeks.  Discussed sodium  Orders:  -     amLODIPine (NORVASC) 5 MG tablet; Take 1 tablet by mouth Daily.  Dispense: 30 tablet; Refill: 1            Follow Up   Return in about 3 weeks (around 5/13/2025).  Future Appointments   Date Time Provider Department Center   5/13/2025 11:45 AM Edel Chowdary APRN [facility]   9/16/2025  1:15 PM Edel Chowdary APRN [facility]       New Medications Ordered This Visit   Medications    amLODIPine (NORVASC) 5 MG tablet     Sig: Take 1 tablet by mouth Daily.     Dispense:  30 tablet     Refill:  1       There are no discontinued medications.       Review of Systems    Objective     Vitals:    04/22/25 1036 04/22/25 1116   BP: (!) 205/89 (!) 193/92   BP Location: Left arm Right arm   Patient Position: Sitting Sitting   Cuff Size: Adult Adult   Pulse: 61 58   Temp: 97.6 °F (36.4 °C)    TempSrc: Oral    SpO2: 98%    Weight: 56.2 kg (124 lb)    Height: 172.7 cm (67.99\")          Physical Exam  Vitals reviewed.   Constitutional:       Appearance: Normal appearance.   HENT:      Head: Normocephalic.   Eyes:      Pupils: Pupils are " equal, round, and reactive to light.   Cardiovascular:      Rate and Rhythm: Normal rate and regular rhythm.      Heart sounds: No murmur heard.  Pulmonary:      Effort: Pulmonary effort is normal.      Breath sounds: Normal breath sounds.   Musculoskeletal:         General: Normal range of motion.   Neurological:      Mental Status: He is alert.   Psychiatric:         Mood and Affect: Mood normal.         Behavior: Behavior normal.               Result Review        No Known Allergies   No past medical history on file.  Current Outpatient Medications   Medication Sig Dispense Refill    levothyroxine (Synthroid) 75 MCG tablet Take 1 tablet by mouth Every Morning. 90 tablet 1    lisinopril (PRINIVIL,ZESTRIL) 40 MG tablet Take 1 tablet by mouth Daily. 30 tablet 1    omeprazole (priLOSEC) 40 MG capsule Take 1 capsule by mouth Daily. Indications: Gastroesophageal Reflux Disease      amLODIPine (NORVASC) 5 MG tablet Take 1 tablet by mouth Daily. 30 tablet 1     No current facility-administered medications for this visit.     No past surgical history on file.   Health Maintenance Due   Topic Date Due    ZOSTER VACCINE (1 of 2) Never done    COVID-19 Vaccine (2 - Yadira risk series) 04/05/2021    LIPID PANEL  12/22/2021      Immunization History   Administered Date(s) Administered    Arexvy (RSV, Adults 60+ yrs) 01/22/2024    COVID-19 (YADIRA) 03/08/2021    Flu Vaccine Quad PF >36MO 11/16/2021    Flu Vaccine Split Quad 11/16/2021    Fluzone  >6mos 11/13/2013    Fluzone High-Dose 65+yrs 01/06/2023, 11/29/2023         Part of this note may be an electronic transcription/translation of spoken language to printed   text using the Dragon Dictation System.      SELENA Ivy

## 2025-04-25 ENCOUNTER — READMISSION MANAGEMENT (OUTPATIENT)
Dept: CALL CENTER | Facility: HOSPITAL | Age: 89
End: 2025-04-25
Payer: MEDICARE

## 2025-04-25 ENCOUNTER — TELEPHONE (OUTPATIENT)
Dept: FAMILY MEDICINE CLINIC | Age: 89
End: 2025-04-25
Payer: MEDICARE

## 2025-04-25 DIAGNOSIS — F41.1 GENERALIZED ANXIETY DISORDER: ICD-10-CM

## 2025-04-25 DIAGNOSIS — F32.9 REACTIVE DEPRESSION: ICD-10-CM

## 2025-04-25 NOTE — OUTREACH NOTE
Prep Survey      Flowsheet Row Responses   [facility] facility patient discharged from? Non-BH   Is LACE score < 7 ? Non-BH Discharge   Eligibility [facility]   H[facility]   Date of Discharge 04/25/25   Discharge Disposition Home or Self Care   Discharge diagnosis fall, hip fracture   Does the patient have one of the following disease processes/diagnoses(primary or secondary)? Other   Prep survey completed? Yes            Starr VERNON - Registered Nurse

## 2025-04-26 ENCOUNTER — TRANSITIONAL CARE MANAGEMENT TELEPHONE ENCOUNTER (OUTPATIENT)
Dept: CALL CENTER | Facility: HOSPITAL | Age: 89
End: 2025-04-26
Payer: MEDICARE

## 2025-04-26 NOTE — OUTREACH NOTE
Call Center TCM Note      Flowsheet Row Responses   [facility] facility patient discharged from? Non-BH   Does the patient have one of the following disease processes/diagnoses(primary or secondary)? Other   TCM attempt successful? No   Unsuccessful attempts Attempt 1            Loraine H - Registered Nurse    4/26/2025, 12:10 EDT

## 2025-04-28 ENCOUNTER — TRANSITIONAL CARE MANAGEMENT TELEPHONE ENCOUNTER (OUTPATIENT)
Dept: CALL CENTER | Facility: HOSPITAL | Age: 89
End: 2025-04-28
Payer: MEDICARE

## 2025-04-28 ENCOUNTER — TELEPHONE (OUTPATIENT)
Dept: FAMILY MEDICINE CLINIC | Age: 89
End: 2025-04-28
Payer: MEDICARE

## 2025-04-28 NOTE — OUTREACH NOTE
Call Center TCM Note      Flowsheet Row Responses   [facility] facility patient discharged from? Non-BH  [[facility]]   Does the patient have one of the following disease processes/diagnoses(primary or secondary)? Other   TCM attempt successful? No  [vr for Laurence Qureshi dtr]   Unsuccessful attempts Attempt 2   Call Status Left message            SHANELL Davies Registered Nurse    4/28/2025, 10:39 EDT

## 2025-04-28 NOTE — TELEPHONE ENCOUNTER
Caller: DARCI VALVERDE    Relationship: Emergency Contact    Best call back number: [phone]    What medication are you requesting: ZOLOFT 25MG AND TAMSULOSIN     What are your current symptoms: DEPRESSION AND ENLARGED PROSTATE     How long have you been experiencing symptoms: N/A     Have you had these symptoms before:    [x] Yes  [] No    Have you been treated for these symptoms before:   [x] Yes  [] No    If a prescription is needed, what is your preferred pharmacy and phone number: [facility] - [address] [phone] [facility] [phone] FX     Additional notes: PATIENT WAS PRESCRIBED ZOLOFT WHILE RECENTLY AT [facility] BUT THEY DID NOT SEND HIM HOME WITH ANY REFILLS. PATIENTS DAUGHTER REQUESTING IF A PRESCRIPTION FOR THIS CAN BE SENT TO [facility]. PATIENT ALSO STATES WHILE AT [facility] THE NURSES HAD A HARD TIME PLACING A CATHETER FOR THE PATIENT AND EXPLAINED THAT HIS PROSTATE SEEMED ENLARGED. DAUGHTER THOUGHT THE PATIENT HAD BEEN TAKING HIS PROSTATE MEDICATION BUT WAS INFORMED BY THE PATIENT HE HAD NOT BEEN. DARCI WANTING TO KNOW IF APRN MOUSER WOULD LIKE PATIENT TO START TAKING HIS TAMSULOSIN AGAIN AND IF SO CAN A NEW PRESCRIPTION FOR THIS ALSO BE CALLED IN.

## 2025-04-29 ENCOUNTER — TRANSITIONAL CARE MANAGEMENT TELEPHONE ENCOUNTER (OUTPATIENT)
Dept: CALL CENTER | Facility: HOSPITAL | Age: 89
End: 2025-04-29
Payer: MEDICARE

## 2025-04-29 NOTE — OUTREACH NOTE
"Call Center TCM Note      Flowsheet Row Responses   [facility] [place] patient discharged from? Non-BH  [[facility]]   Does the patient have one of the following disease processes/diagnoses(primary or secondary)? Other   TCM attempt successful? Yes  [vr for Laurence Qureshi, dtr]   Call start time 0931   Call end time 0938   Discharge diagnosis fall, hip fracture   Meds reviewed with patient/caregiver? Yes   Is the patient having any side effects they believe may be caused by any medication additions or changes? No   Does the patient have all medications ordered at discharge? Yes   Is the patient taking all medications as directed (includes completed medication regime)? Yes   Medication comments Dtr reports that pt  discharged to resume zoloft but has none at home for use and also dtr requests flomax for prostate issues--will send a meassage to office for f/u.  Dtr reports that since home urinating ok right now.   Comments TCM FOLLOW UP APPOINTMENT IS 5/7/25@1100am(appt in place at time of call)   Does the patient have an appointment with their PCP within 7-14 days of discharge? Yes   What is the Home health agency?  [facility]   Has home health visited the patient within 72 hours of discharge? Yes   DME comments Pt using walker for ambulation   Psychosocial issues? No   Did the patient receive a copy of their discharge instructions? Yes   Nursing interventions Reviewed instructions with patient   What is the patient's perception of their health status since discharge? Same  [Dtr reports that pt is \"doing great\" and working with P.T. Hip dressing in place and to remain until clinic f/u.  Reviewed post op instructions, s/s infection with dtr.  Appts in place.]   Is the patient/caregiver able to teach back signs and symptoms related to disease process for when to call PCP? Yes   Is the patient/caregiver able to teach back signs and symptoms related to disease process for when to call 911? Yes   TCM call completed? Yes   Call end time " 0938            SHANELL DARNELL - Registered Nurse    4/29/2025, 09:47 EDT

## 2025-04-30 ENCOUNTER — TELEPHONE (OUTPATIENT)
Dept: FAMILY MEDICINE CLINIC | Age: 89
End: 2025-04-30
Payer: MEDICARE

## 2025-04-30 DIAGNOSIS — R32 URINARY INCONTINENCE, UNSPECIFIED TYPE: Primary | ICD-10-CM

## 2025-04-30 RX ORDER — SERTRALINE HYDROCHLORIDE 25 MG/1
25 TABLET, FILM COATED ORAL DAILY
Qty: 90 TABLET | Refills: 1 | OUTPATIENT
Start: 2025-04-30

## 2025-04-30 NOTE — TELEPHONE ENCOUNTER
Gris with home health voiced that pt is having  an increase in urinary inc and she is requesting a u/s c and sp [place] mouser inf she is ok with the order

## 2025-05-01 ENCOUNTER — LAB REQUISITION (OUTPATIENT)
Dept: LAB | Facility: HOSPITAL | Age: 89
End: 2025-05-01
Payer: MEDICARE

## 2025-05-01 DIAGNOSIS — N39.0 URINARY TRACT INFECTION, SITE NOT SPECIFIED: ICD-10-CM

## 2025-05-01 DIAGNOSIS — F32.9 REACTIVE DEPRESSION: Primary | ICD-10-CM

## 2025-05-01 DIAGNOSIS — R35.1 NOCTURIA: ICD-10-CM

## 2025-05-01 LAB
BACTERIA UR QL AUTO: NORMAL /HPF
BILIRUB UR QL STRIP: NEGATIVE
CLARITY UR: CLEAR
COLOR UR: ABNORMAL
GLUCOSE UR STRIP-MCNC: NEGATIVE MG/DL
HGB UR QL STRIP.AUTO: ABNORMAL
HYALINE CASTS UR QL AUTO: NORMAL /LPF
KETONES UR QL STRIP: NEGATIVE
LEUKOCYTE ESTERASE UR QL STRIP.AUTO: NEGATIVE
NITRITE UR QL STRIP: NEGATIVE
PH UR STRIP.AUTO: 5.5 [PH] (ref 5–8)
PROT UR QL STRIP: ABNORMAL
RBC # UR STRIP: NORMAL /HPF
REF LAB TEST METHOD: NORMAL
SP GR UR STRIP: 1.02 (ref 1–1.03)
SQUAMOUS #/AREA URNS HPF: NORMAL /HPF
UROBILINOGEN UR QL STRIP: ABNORMAL
WBC # UR STRIP: NORMAL /HPF

## 2025-05-01 PROCEDURE — 81001 URINALYSIS AUTO W/SCOPE: CPT | Performed by: NURSE PRACTITIONER

## 2025-05-01 RX ORDER — SERTRALINE HYDROCHLORIDE 25 MG/1
25 TABLET, FILM COATED ORAL DAILY
Qty: 90 TABLET | Refills: 1 | Status: SHIPPED | OUTPATIENT
Start: 2025-05-01

## 2025-05-01 RX ORDER — TAMSULOSIN HYDROCHLORIDE 0.4 MG/1
1 CAPSULE ORAL DAILY
Qty: 90 CAPSULE | Refills: 1 | Status: SHIPPED | OUTPATIENT
Start: 2025-05-01

## 2025-05-06 ENCOUNTER — TELEPHONE (OUTPATIENT)
Dept: FAMILY MEDICINE CLINIC | Age: 89
End: 2025-05-06
Payer: MEDICARE

## 2025-05-06 DIAGNOSIS — L89.90 PRESSURE INJURY: Primary | ICD-10-CM

## 2025-05-06 DIAGNOSIS — C90.00 MULTIPLE MYELOMA WITH FAILED REMISSION: ICD-10-CM

## 2025-05-06 NOTE — TELEPHONE ENCOUNTER
Caller: ELINA LIU- IAN [facility]    Relationship:     Best call back number: [phone]     What is the best time to reach you: ANYTIME     Who are you requesting to speak with (clinical staff, provider,  specific staff member): CLINICAL     What was the call regarding: NURSE BRAULIO DUTTA [facility] IS CALLING REQUESTING FOR A  SILVADENE CREAM AND MEPILEX DRESSING TO HEALTH PATIENT PRESSURE ULCERS ON RIGHT BUTTOCKS.  ALSO REQUESTING FOR A ORDER FOR WHEEL CHAIR GEL CUSHION.

## 2025-05-07 ENCOUNTER — OFFICE VISIT (OUTPATIENT)
Dept: FAMILY MEDICINE CLINIC | Age: 89
End: 2025-05-07
Payer: MEDICARE

## 2025-05-07 VITALS
SYSTOLIC BLOOD PRESSURE: 126 MMHG | OXYGEN SATURATION: 98 % | BODY MASS INDEX: 18.86 KG/M2 | HEIGHT: 68 IN | HEART RATE: 71 BPM | TEMPERATURE: 98 F | DIASTOLIC BLOOD PRESSURE: 54 MMHG

## 2025-05-07 DIAGNOSIS — S72.001A CLOSED FRACTURE OF RIGHT HIP, INITIAL ENCOUNTER: Primary | ICD-10-CM

## 2025-05-07 DIAGNOSIS — N18.4 STAGE 4 CHRONIC KIDNEY DISEASE: ICD-10-CM

## 2025-05-07 DIAGNOSIS — C90.00 MULTIPLE MYELOMA WITH FAILED REMISSION: ICD-10-CM

## 2025-05-07 DIAGNOSIS — D63.8 ANEMIA OF CHRONIC DISEASE: ICD-10-CM

## 2025-05-07 DIAGNOSIS — I10 HYPERTENSION, UNSPECIFIED TYPE: ICD-10-CM

## 2025-05-07 DIAGNOSIS — L89.90 PRESSURE INJURY: ICD-10-CM

## 2025-05-07 DIAGNOSIS — Z74.09 IMPAIRED MOBILITY AND ADLS: ICD-10-CM

## 2025-05-07 DIAGNOSIS — Z78.9 IMPAIRED MOBILITY AND ADLS: ICD-10-CM

## 2025-05-07 PROCEDURE — 99495 TRANSJ CARE MGMT MOD F2F 14D: CPT | Performed by: NURSE PRACTITIONER

## 2025-05-07 PROCEDURE — 1125F AMNT PAIN NOTED PAIN PRSNT: CPT | Performed by: NURSE PRACTITIONER

## 2025-05-07 PROCEDURE — 1111F DSCHRG MED/CURRENT MED MERGE: CPT | Performed by: NURSE PRACTITIONER

## 2025-05-07 RX ORDER — LACTULOSE 10 G/15ML
20 SOLUTION ORAL; RECTAL 2 TIMES DAILY PRN
COMMUNITY
Start: 2025-04-25

## 2025-05-07 RX ORDER — LANOLIN ALCOHOL/MO/W.PET/CERES
1 CREAM (GRAM) TOPICAL NIGHTLY
COMMUNITY
Start: 2025-04-25

## 2025-05-07 RX ORDER — ONDANSETRON 4 MG/1
4 TABLET, FILM COATED ORAL 3 TIMES DAILY PRN
COMMUNITY
Start: 2025-04-24

## 2025-05-07 RX ORDER — OXYCODONE AND ACETAMINOPHEN 7.5; 325 MG/1; MG/1
1 TABLET ORAL EVERY 4 HOURS PRN
COMMUNITY
Start: 2025-04-24

## 2025-05-07 RX ORDER — ASPIRIN 325 MG
325 TABLET ORAL DAILY
COMMUNITY
Start: 2025-04-25 | End: 2025-05-27

## 2025-05-07 NOTE — PROGRESS NOTES
"Chief Complaint  Eze Jauregui presents to [facility] for transition of care.      Eze IS here today with family to follow up after a recent hospitalization.  The events that lead up to the hospitalization are as copied  from the H&P from the hospital admission :   \" 89-year-old patient was in normal state of health while he trying to change a light bulb stood up in a chair and lost balance and fell into the floor hitting his head and his right hip without loss of conscious, he presented to the emergency room and he was diagnosed with an acute angulated right femoral neck fracture  \"    Within 48 business hours after discharge our office contacted him via telephone to coordinate his care and needs.    Current outpatient and discharge medications have been reconciled for the patient.  Reviewed by: SELENA Ivy       Eze was admitted to [facility] on 4/22/2025 and was discharged on 4/25/2025 with a DIAGNOSIS  of  closed fracture of right hip , fall, uncontrolled hypertension  and treated by hospitalist with consultations to orthopedics, Dr. Rosa.      His TREATMENT included surgical repair during the hospital stay.       -reviewed discharge summary? Yes  -reviewed medication changes and discussed those changes? Yes  Medication changes as listed: aspirin, lactulose, magnesium oxide, zofran, oxycodone, sennosides-docusate  -reviewed and discussed hospital problems Yes  -reviewed and discussed abnormal values of inpatient lab results (only those noted in results review) Yes  -reviewed and discussed abnormal findings from inpatient diagnostic studies (only those noted in results review) Yes    Result Review   The following data was reviewed by: SELENA Ivy on 05/07/2025:  CBC with Auto Diff (04/22/2025 17:09)  COMPREHENSIVE METABOLIC PANEL (04/22/2025 17:21)  Urinalysis, Microscopic Only - (04/22/2025 18:11)  Urinalysis, Reflex Microscopic and " Culture If Indicated (04/22/2025 18:11)  COMPREHENSIVE METABOLIC PANEL (04/23/2025 05:00)  CBC with automated diff (04/23/2025 05:00)  Vitamin B12 (04/24/2025 05:55)  Folate, Serum (04/24/2025 05:55)  Iron and TIBC (04/24/2025 05:55)  CBC - Hemogram ([facility]) (04/25/2025 06:26)  MAGNESIUM (04/25/2025 06:26)  HEMOGLOBIN A1C (04/25/2025 06:26)  BASIC METABOLIC PANEL (04/25/2025 06:26)  XR femur right (04/22/2025 16:54)  XR Hip With or Without Pelvis 2 - 3 View Right (04/22/2025 16:54)  XR Chest 1 View (04/22/2025 16:54)  CT cervical spine without contrast (04/22/2025 16:56)  CT Head Without Contrast (04/22/2025 16:56)  XR Hip With or Without Pelvis 2 - 3 View Right (04/24/2025 11:29)  XR Hip With or Without Pelvis 2 - 3 View Right (04/24/2025 12:16)  Additional discharge recommendations include:follow up with Dr. Rosa as advised.     Eze reports that his condition is improved since discharge.    Eze is struggling with ambulation at home.  He is in need of a wheelchair at home to help with his ADLs and will need a cushion due to pressure ulcer on his sacral area .  Patients mobility limitation impairs ability to participate in one or more activities such as toileting, feeding and dressing, grooming and bathing;  and mobility limtation can not be resolved by use of a cane or walker.  Patient is able to safely use a manual wheelchair.   Patients functional mobility deficit can be resolved by the use of a manual wheelchair and can maneuver the chair independently or his daughter or caregiver is able to assist with maneuvering.  He has the strength to maneuver the wheelchair and also has caregivers that are able to assist when needed     Assessment and Plan     I have advised that Eze keep all specialty appointments that have been recommended during the hospital stay to ensure adequate follow up and management of any conditions that require ongoing treatment and monitoring.      Diagnoses and all orders for  "this visit:    1. Closed fracture of right hip, initial encounter (Primary)  Comments:  cotinue with PT, follow up with Dr. Rosa as scheduled monitor area for any concerns advised to continue aspirin as recommended    2. Stage 4 chronic kidney disease  Comments:  Baseline creatinine 2.6-2.8.  Will continue to monitor routinely  Orders:  -     Comprehensive metabolic panel; Future    3. Anemia of chronic disease  Comments:  Continue to monitor as needed  labs with home health in 2 weeks  Orders:  -     CBC & Differential; Future    4. Hypertension, unspecified type  Comments:  Blood pressure better controlled today    5. Multiple myeloma with failed remission    6. Impaired mobility and ADLs    7. Pressure injury        Follow Up   Return in about 3 months (around 8/7/2025), or if symptoms worsen or fail to improve, for Recheck.  Follow up appts currently scheduled  include:    Future Appointments   Date Time Provider Department Center   9/16/2025  1:15 PM Edel Chowdary APRN [facility]GG[facility]    .      Appointments that need to be made include:none    No orders of the defined types were placed in this encounter.      There are no discontinued medications.    Review of Systems    Objective     Vitals:    05/07/25 1100   BP: 126/54   BP Location: Left arm   Patient Position: Sitting   Cuff Size: Pediatric   Pulse: 71   Temp: 98 °F (36.7 °C)   TempSrc: Oral   SpO2: 98%   Height: 172.7 cm (67.99\")     Body mass index is 18.86 kg/m².     Physical Exam  Vitals reviewed.   Constitutional:       Appearance: Normal appearance.   HENT:      Head: Normocephalic.   Eyes:      Pupils: Pupils are equal, round, and reactive to light.   Cardiovascular:      Rate and Rhythm: Normal rate and regular rhythm.      Heart sounds: No murmur heard.  Pulmonary:      Effort: Pulmonary effort is normal.      Breath sounds: Normal breath sounds.   Musculoskeletal:         General: Normal range of motion.   Skin:     Comments: Left hip with " "surgical incision intact   Neurological:      Mental Status: He is alert.   Psychiatric:         Mood and Affect: Mood normal.         Behavior: Behavior normal.                        No Known Allergies   No past medical history on file.  Current Outpatient Medications   Medication Sig Dispense Refill    amLODIPine (NORVASC) 5 MG tablet Take 1 tablet by mouth Daily. 30 tablet 1    aspirin 325 MG tablet Take 1 tablet by mouth Daily.      Enulose 10 GM/15ML solution solution (encephalopathy) Take 30 mL by mouth 2 (Two) Times a Day As Needed.      levothyroxine (Synthroid) 75 MCG tablet Take 1 tablet by mouth Every Morning. 90 tablet 1    lisinopril (PRINIVIL,ZESTRIL) 40 MG tablet Take 1 tablet by mouth Daily. 30 tablet 1    Magnesium Oxide -Mg Supplement 400 (240 Mg) MG tablet Take 1 tablet by mouth Every Night.      omeprazole (priLOSEC) 40 MG capsule Take 1 capsule by mouth Daily. Indications: Gastroesophageal Reflux Disease      ondansetron (ZOFRAN) 4 MG tablet Take 1 tablet by mouth 3 (Three) Times a Day As Needed.      oxyCODONE-acetaminophen (PERCOCET) 7.5-325 MG per tablet Take 1 tablet by mouth Every 4 (Four) Hours As Needed.      sertraline (Zoloft) 25 MG tablet Take 1 tablet by mouth Daily. 90 tablet 1    tamsulosin (FLOMAX) 0.4 MG capsule 24 hr capsule Take 1 capsule by mouth Daily. 90 capsule 1    Silver (Mepilex AG) 4\"X4\" pads Apply 1 each topically Daily. Apply to pressure ulcer as needed 30 each 0    silver sulfadiazine (Silvadene) 1 % cream Apply 1 Application topically to the appropriate area as directed 2 (Two) Times a Day. 50 g 0     No current facility-administered medications for this visit.     No past surgical history on file.   Health Maintenance Due   Topic Date Due    ZOSTER VACCINE (1 of 2) Never done    COVID-19 Vaccine (2 - Teddy risk series) 04/05/2021    LIPID PANEL  12/22/2021      Immunization History   Administered Date(s) Administered    Arexvy (RSV, Adults 60+ yrs) 01/22/2024    " COVID-19 (YADIRA) 03/08/2021    Flu Vaccine Quad PF >36MO 11/16/2021    Flu Vaccine Split Quad 11/16/2021    Fluzone  >6mos 11/13/2013    Fluzone High-Dose 65+yrs 01/06/2023, 11/29/2023         Part of this note may be an electronic transcription/translation of spoken language to printed   text using the Dragon Dictation System.      Edel Chowdary, APRN

## 2025-05-08 ENCOUNTER — TELEPHONE (OUTPATIENT)
Dept: FAMILY MEDICINE CLINIC | Age: 89
End: 2025-05-08
Payer: MEDICARE

## 2025-05-08 NOTE — TELEPHONE ENCOUNTER
Pt daughter called stating pt had a large amount of blood in stool this morning, bright red in color, no abd pain or other symptoms at this time, his daughter did contact the ortho doctor since he just had surgery they had increased his aspirin to 325mg, so they may lower it back to the 81mg, I did tell daughter if he has another BM with that much blood to head over to ER for eval

## 2025-05-09 RX ORDER — SILVER SULFADIAZINE 10 MG/G
1 CREAM TOPICAL 2 TIMES DAILY
Qty: 50 G | Refills: 0 | Status: SHIPPED | OUTPATIENT
Start: 2025-05-09

## 2025-05-09 NOTE — TELEPHONE ENCOUNTER
On call MM said ok for these wound orders but did not send an rx if you could please send to [place] : NURSE BRAULIO IAN [facility] IS CALLING REQUESTING FOR A SILVADENE CREAM AND MEPILEX DRESSING TO HEALTH PATIENT PRESSURE ULCERS ON RIGHT BUTTOCKS

## 2025-05-16 ENCOUNTER — TELEPHONE (OUTPATIENT)
Dept: FAMILY MEDICINE CLINIC | Age: 89
End: 2025-05-16
Payer: MEDICARE

## 2025-05-20 ENCOUNTER — LAB REQUISITION (OUTPATIENT)
Dept: LAB | Facility: HOSPITAL | Age: 89
End: 2025-05-20
Payer: MEDICARE

## 2025-05-20 ENCOUNTER — TELEPHONE (OUTPATIENT)
Dept: FAMILY MEDICINE CLINIC | Age: 89
End: 2025-05-20
Payer: MEDICARE

## 2025-05-20 DIAGNOSIS — R73.9 HYPERGLYCEMIA, UNSPECIFIED: ICD-10-CM

## 2025-05-20 DIAGNOSIS — E03.9 HYPOTHYROIDISM, UNSPECIFIED: ICD-10-CM

## 2025-05-20 DIAGNOSIS — N18.30 CHRONIC KIDNEY DISEASE, STAGE 3 UNSPECIFIED: ICD-10-CM

## 2025-05-20 PROBLEM — D63.8 ANEMIA OF CHRONIC DISEASE: Status: ACTIVE | Noted: 2022-12-05

## 2025-05-20 LAB
ALBUMIN SERPL-MCNC: 3.1 G/DL (ref 3.5–5.2)
ALBUMIN/GLOB SERPL: 0.8 G/DL
ALP SERPL-CCNC: 321 U/L (ref 39–117)
ALT SERPL W P-5'-P-CCNC: 18 U/L (ref 1–41)
ANION GAP SERPL CALCULATED.3IONS-SCNC: 12.3 MMOL/L (ref 5–15)
AST SERPL-CCNC: 40 U/L (ref 1–40)
BASOPHILS # BLD AUTO: 0.02 10*3/MM3 (ref 0–0.2)
BASOPHILS NFR BLD AUTO: 0.3 % (ref 0–1.5)
BILIRUB SERPL-MCNC: 0.2 MG/DL (ref 0–1.2)
BUN SERPL-MCNC: 39 MG/DL (ref 8–23)
BUN/CREAT SERPL: 17.5 (ref 7–25)
CALCIUM SPEC-SCNC: 8 MG/DL (ref 8.6–10.5)
CHLORIDE SERPL-SCNC: 107 MMOL/L (ref 98–107)
CO2 SERPL-SCNC: 17.7 MMOL/L (ref 22–29)
CREAT SERPL-MCNC: 2.23 MG/DL (ref 0.76–1.27)
DEPRECATED RDW RBC AUTO: 52.8 FL (ref 37–54)
EGFRCR SERPLBLD CKD-EPI 2021: 27.8 ML/MIN/1.73
EOSINOPHIL # BLD AUTO: 0.18 10*3/MM3 (ref 0–0.4)
EOSINOPHIL NFR BLD AUTO: 2.6 % (ref 0.3–6.2)
ERYTHROCYTE [DISTWIDTH] IN BLOOD BY AUTOMATED COUNT: 14.5 % (ref 12.3–15.4)
GLOBULIN UR ELPH-MCNC: 3.9 GM/DL
GLUCOSE SERPL-MCNC: 117 MG/DL (ref 65–99)
HCT VFR BLD AUTO: 28.3 % (ref 37.5–51)
HGB BLD-MCNC: 8.7 G/DL (ref 13–17.7)
IMM GRANULOCYTES # BLD AUTO: 0.05 10*3/MM3 (ref 0–0.05)
IMM GRANULOCYTES NFR BLD AUTO: 0.7 % (ref 0–0.5)
LYMPHOCYTES # BLD AUTO: 1.79 10*3/MM3 (ref 0.7–3.1)
LYMPHOCYTES NFR BLD AUTO: 25.9 % (ref 19.6–45.3)
MCH RBC QN AUTO: 30.4 PG (ref 26.6–33)
MCHC RBC AUTO-ENTMCNC: 30.7 G/DL (ref 31.5–35.7)
MCV RBC AUTO: 99 FL (ref 79–97)
MONOCYTES # BLD AUTO: 0.67 10*3/MM3 (ref 0.1–0.9)
MONOCYTES NFR BLD AUTO: 9.7 % (ref 5–12)
NEUTROPHILS NFR BLD AUTO: 4.19 10*3/MM3 (ref 1.7–7)
NEUTROPHILS NFR BLD AUTO: 60.8 % (ref 42.7–76)
PLATELET # BLD AUTO: 290 10*3/MM3 (ref 140–450)
PMV BLD AUTO: 9.7 FL (ref 6–12)
POTASSIUM SERPL-SCNC: 5.8 MMOL/L (ref 3.5–5.2)
PROT SERPL-MCNC: 7 G/DL (ref 6–8.5)
RBC # BLD AUTO: 2.86 10*6/MM3 (ref 4.14–5.8)
SODIUM SERPL-SCNC: 137 MMOL/L (ref 136–145)
WBC NRBC COR # BLD AUTO: 6.9 10*3/MM3 (ref 3.4–10.8)

## 2025-05-20 PROCEDURE — 80053 COMPREHEN METABOLIC PANEL: CPT | Performed by: NURSE PRACTITIONER

## 2025-05-20 PROCEDURE — 85025 COMPLETE CBC W/AUTO DIFF WBC: CPT | Performed by: NURSE PRACTITIONER

## 2025-05-20 NOTE — TELEPHONE ENCOUNTER
----- Message from Edel Mouser sent at 5/20/2025 10:16 AM EDT -----  Can you have Home health draw CBC with diff and CMP on Mr. Jauregui.  I put the order in my last TCM note  - thanks  Can't remember who they are using for home health

## 2025-05-22 PROBLEM — Z74.09 IMPAIRED MOBILITY AND ADLS: Status: ACTIVE | Noted: 2025-05-22

## 2025-05-22 PROBLEM — Z78.9 IMPAIRED MOBILITY AND ADLS: Status: ACTIVE | Noted: 2025-05-22

## 2025-05-22 PROBLEM — L89.90: Status: ACTIVE | Noted: 2025-05-22

## 2025-05-28 ENCOUNTER — LAB (OUTPATIENT)
Dept: LAB | Facility: HOSPITAL | Age: 89
End: 2025-05-28
Payer: MEDICARE

## 2025-05-28 DIAGNOSIS — D64.9 ANEMIA, UNSPECIFIED TYPE: ICD-10-CM

## 2025-05-28 DIAGNOSIS — N18.4 STAGE 4 CHRONIC KIDNEY DISEASE: ICD-10-CM

## 2025-05-28 DIAGNOSIS — D63.8 ANEMIA OF CHRONIC DISEASE: ICD-10-CM

## 2025-05-28 DIAGNOSIS — N18.4 CKD (CHRONIC KIDNEY DISEASE) STAGE 4, GFR 15-29 ML/MIN: ICD-10-CM

## 2025-05-28 LAB
ALBUMIN SERPL-MCNC: 3.4 G/DL (ref 3.5–5.2)
ALBUMIN/GLOB SERPL: 0.7 G/DL
ALP SERPL-CCNC: 455 U/L (ref 39–117)
ALT SERPL W P-5'-P-CCNC: 33 U/L (ref 1–41)
ANION GAP SERPL CALCULATED.3IONS-SCNC: 11 MMOL/L (ref 5–15)
AST SERPL-CCNC: 60 U/L (ref 1–40)
BASOPHILS # BLD AUTO: 0.02 10*3/MM3 (ref 0–0.2)
BASOPHILS NFR BLD AUTO: 0.2 % (ref 0–1.5)
BILIRUB SERPL-MCNC: 0.3 MG/DL (ref 0–1.2)
BUN SERPL-MCNC: 41 MG/DL (ref 8–23)
BUN/CREAT SERPL: 18.3 (ref 7–25)
CALCIUM SPEC-SCNC: 8.6 MG/DL (ref 8.6–10.5)
CHLORIDE SERPL-SCNC: 108 MMOL/L (ref 98–107)
CO2 SERPL-SCNC: 19 MMOL/L (ref 22–29)
CREAT SERPL-MCNC: 2.24 MG/DL (ref 0.76–1.27)
DEPRECATED RDW RBC AUTO: 55.5 FL (ref 37–54)
EGFRCR SERPLBLD CKD-EPI 2021: 27.7 ML/MIN/1.73
EOSINOPHIL # BLD AUTO: 0.14 10*3/MM3 (ref 0–0.4)
EOSINOPHIL NFR BLD AUTO: 1.7 % (ref 0.3–6.2)
ERYTHROCYTE [DISTWIDTH] IN BLOOD BY AUTOMATED COUNT: 15.1 % (ref 12.3–15.4)
GLOBULIN UR ELPH-MCNC: 4.6 GM/DL
GLUCOSE SERPL-MCNC: 111 MG/DL (ref 65–99)
HCT VFR BLD AUTO: 30.8 % (ref 37.5–51)
HGB BLD-MCNC: 9.3 G/DL (ref 13–17.7)
IMM GRANULOCYTES # BLD AUTO: 0.02 10*3/MM3 (ref 0–0.05)
IMM GRANULOCYTES NFR BLD AUTO: 0.2 % (ref 0–0.5)
LYMPHOCYTES # BLD AUTO: 1.65 10*3/MM3 (ref 0.7–3.1)
LYMPHOCYTES NFR BLD AUTO: 20.4 % (ref 19.6–45.3)
MCH RBC QN AUTO: 29.9 PG (ref 26.6–33)
MCHC RBC AUTO-ENTMCNC: 30.2 G/DL (ref 31.5–35.7)
MCV RBC AUTO: 99 FL (ref 79–97)
MONOCYTES # BLD AUTO: 0.69 10*3/MM3 (ref 0.1–0.9)
MONOCYTES NFR BLD AUTO: 8.5 % (ref 5–12)
NEUTROPHILS NFR BLD AUTO: 5.57 10*3/MM3 (ref 1.7–7)
NEUTROPHILS NFR BLD AUTO: 69 % (ref 42.7–76)
PHOSPHATE SERPL-MCNC: 4.5 MG/DL (ref 2.5–4.5)
PLATELET # BLD AUTO: 258 10*3/MM3 (ref 140–450)
PMV BLD AUTO: 8.8 FL (ref 6–12)
POTASSIUM SERPL-SCNC: 6 MMOL/L (ref 3.5–5.2)
PROT SERPL-MCNC: 8 G/DL (ref 6–8.5)
RBC # BLD AUTO: 3.11 10*6/MM3 (ref 4.14–5.8)
SODIUM SERPL-SCNC: 138 MMOL/L (ref 136–145)
WBC NRBC COR # BLD AUTO: 8.09 10*3/MM3 (ref 3.4–10.8)

## 2025-05-28 PROCEDURE — 85025 COMPLETE CBC W/AUTO DIFF WBC: CPT

## 2025-05-28 PROCEDURE — 80053 COMPREHEN METABOLIC PANEL: CPT

## 2025-05-28 PROCEDURE — 36415 COLL VENOUS BLD VENIPUNCTURE: CPT

## 2025-05-28 PROCEDURE — 84100 ASSAY OF PHOSPHORUS: CPT

## 2025-06-02 ENCOUNTER — RESULTS FOLLOW-UP (OUTPATIENT)
Dept: LAB | Facility: HOSPITAL | Age: 89
End: 2025-06-02
Payer: MEDICARE

## 2025-06-02 DIAGNOSIS — R60.0 LOCALIZED EDEMA: ICD-10-CM

## 2025-06-02 DIAGNOSIS — N18.4 CKD (CHRONIC KIDNEY DISEASE) STAGE 4, GFR 15-29 ML/MIN: Primary | ICD-10-CM

## 2025-06-02 DIAGNOSIS — D63.8 ANEMIA OF CHRONIC DISEASE: ICD-10-CM

## 2025-06-02 DIAGNOSIS — E87.5 HYPERKALEMIA: ICD-10-CM

## 2025-06-02 DIAGNOSIS — N18.4 STAGE 4 CHRONIC KIDNEY DISEASE: Primary | ICD-10-CM

## 2025-06-02 RX ORDER — FUROSEMIDE 40 MG/1
40 TABLET ORAL 2 TIMES DAILY
Qty: 6 TABLET | Refills: 0 | Status: SHIPPED | OUTPATIENT
Start: 2025-06-02 | End: 2025-06-05

## 2025-06-20 ENCOUNTER — TELEPHONE (OUTPATIENT)
Dept: FAMILY MEDICINE CLINIC | Age: 89
End: 2025-06-20
Payer: MEDICARE

## 2025-06-20 NOTE — TELEPHONE ENCOUNTER
Caller: BRAULIO WITH [facility]    Relationship to patient: Other    Best call back number: [phone]     Patient is needing: CALLER STATED NEED FOR VERBAL ORDERS FOR SKILLED NURSING 1 TIME A WEEK FOR 4 WEEKS AND 1 TIME EVERY OTHER WEEK FOR 9 WEEKS

## 2025-06-25 NOTE — TELEPHONE ENCOUNTER
Caller: BRAULIO- RN- [facility]    Relationship: Other    Best call back number: [phone]     What is the best time to reach you: ANYTIME     Who are you requesting to speak with (clinical staff, provider,  specific staff member): CLINICAL     What was the call regarding: BRAULIO FROM [facility] IS CALLING WANTING TO TOUCH BASE ON KIDNEY SPECIALIST PRESCRIBED PATIENT LASIX BUT NOT POTASSIUM, AND WANTED TO INFORM PCP.

## 2025-06-25 NOTE — TELEPHONE ENCOUNTER
Take 2 tablets (80 mg total) by mouth in the morning and 2 tablets (80 mg total) in the evening. 1   Per ov note from kidney specialist and also Hyperkalemia  Potassium is 6.0  Will start Lokelma 5 g daily  And  Volume overload  Patient is visibly volume overloaded and will increase Lasix to 80 mg twice daily for 5 days and then once daily     Please see 6- OV

## 2025-06-25 NOTE — TELEPHONE ENCOUNTER
6-7-2025 renal function panel results not noted I lmom for Gris to call me  back , maybe we should also ask home health to draw a K+ level please advise

## 2025-06-30 ENCOUNTER — TELEPHONE (OUTPATIENT)
Dept: FAMILY MEDICINE CLINIC | Age: 89
End: 2025-06-30
Payer: MEDICARE

## 2025-06-30 NOTE — TELEPHONE ENCOUNTER
A user error has taken place: encounter opened in error, closed for administrative reasons.     MEDICATIONS  (PRN):  acetaminophen     Tablet .. 650 milliGRAM(s) Oral every 6 hours PRN Mild Pain (1 - 3)  loperamide 2 milliGRAM(s) Oral daily PRN Diarrhea  magnesium hydroxide Suspension 30 milliLiter(s) Oral daily PRN Constipation  melatonin. 3 milliGRAM(s) Oral at bedtime PRN Insomnia

## 2025-07-08 ENCOUNTER — OUTSIDE FACILITY SERVICE (OUTPATIENT)
Dept: FAMILY MEDICINE CLINIC | Age: 89
End: 2025-07-08
Payer: MEDICARE

## 2025-07-15 ENCOUNTER — LAB (OUTPATIENT)
Dept: LAB | Facility: HOSPITAL | Age: 89
End: 2025-07-15
Payer: MEDICARE

## 2025-07-15 DIAGNOSIS — N18.4 CKD (CHRONIC KIDNEY DISEASE) STAGE 4, GFR 15-29 ML/MIN: ICD-10-CM

## 2025-07-15 DIAGNOSIS — R19.7 DIARRHEA, UNSPECIFIED TYPE: ICD-10-CM

## 2025-07-15 LAB
ALBUMIN SERPL-MCNC: 3.7 G/DL (ref 3.5–5.2)
ANION GAP SERPL CALCULATED.3IONS-SCNC: 18.3 MMOL/L (ref 5–15)
BUN SERPL-MCNC: 105 MG/DL (ref 8–23)
BUN/CREAT SERPL: 26.3 (ref 7–25)
CALCIUM SPEC-SCNC: 9.2 MG/DL (ref 8.6–10.5)
CHLORIDE SERPL-SCNC: 98 MMOL/L (ref 98–107)
CO2 SERPL-SCNC: 17.7 MMOL/L (ref 22–29)
CREAT SERPL-MCNC: 3.99 MG/DL (ref 0.76–1.27)
EGFRCR SERPLBLD CKD-EPI 2021: 13.8 ML/MIN/1.73
GLUCOSE SERPL-MCNC: 129 MG/DL (ref 65–99)
PHOSPHATE SERPL-MCNC: 8 MG/DL (ref 2.5–4.5)
POTASSIUM SERPL-SCNC: 4.9 MMOL/L (ref 3.5–5.2)
SODIUM SERPL-SCNC: 134 MMOL/L (ref 136–145)

## 2025-07-15 PROCEDURE — 82150 ASSAY OF AMYLASE: CPT

## 2025-07-15 PROCEDURE — 83690 ASSAY OF LIPASE: CPT

## 2025-07-15 PROCEDURE — 80069 RENAL FUNCTION PANEL: CPT

## 2025-07-15 PROCEDURE — 36415 COLL VENOUS BLD VENIPUNCTURE: CPT

## 2025-07-15 PROCEDURE — 80053 COMPREHEN METABOLIC PANEL: CPT

## 2025-07-15 PROCEDURE — 84100 ASSAY OF PHOSPHORUS: CPT

## 2025-07-15 PROCEDURE — 82248 BILIRUBIN DIRECT: CPT

## 2025-07-17 ENCOUNTER — OFFICE VISIT (OUTPATIENT)
Dept: FAMILY MEDICINE CLINIC | Age: 89
End: 2025-07-17
Payer: MEDICARE

## 2025-07-17 ENCOUNTER — TRANSCRIBE ORDERS (OUTPATIENT)
Dept: ADMINISTRATIVE | Facility: HOSPITAL | Age: 89
End: 2025-07-17
Payer: MEDICARE

## 2025-07-17 ENCOUNTER — LAB (OUTPATIENT)
Dept: LAB | Facility: HOSPITAL | Age: 89
End: 2025-07-17
Payer: MEDICARE

## 2025-07-17 VITALS
BODY MASS INDEX: 18.36 KG/M2 | SYSTOLIC BLOOD PRESSURE: 116 MMHG | TEMPERATURE: 97.6 F | OXYGEN SATURATION: 98 % | HEIGHT: 67 IN | WEIGHT: 117 LBS | DIASTOLIC BLOOD PRESSURE: 66 MMHG | HEART RATE: 71 BPM

## 2025-07-17 DIAGNOSIS — I10 HYPERTENSION, ESSENTIAL: ICD-10-CM

## 2025-07-17 DIAGNOSIS — N18.30 STAGE 3 CHRONIC KIDNEY DISEASE, UNSPECIFIED WHETHER STAGE 3A OR 3B CKD: Primary | ICD-10-CM

## 2025-07-17 DIAGNOSIS — R63.4 WEIGHT LOSS, NON-INTENTIONAL: ICD-10-CM

## 2025-07-17 DIAGNOSIS — N18.30 STAGE 3 CHRONIC KIDNEY DISEASE, UNSPECIFIED WHETHER STAGE 3A OR 3B CKD: ICD-10-CM

## 2025-07-17 DIAGNOSIS — R19.7 DIARRHEA, UNSPECIFIED TYPE: Primary | ICD-10-CM

## 2025-07-17 LAB
ALBUMIN SERPL-MCNC: 3.4 G/DL (ref 3.5–5.2)
ALBUMIN SERPL-MCNC: 3.6 G/DL (ref 3.5–5.2)
ALBUMIN UR-MCNC: 2.1 MG/DL
ALBUMIN/GLOB SERPL: 0.7 G/DL
ALP SERPL-CCNC: 281 U/L (ref 39–117)
ALP SERPL-CCNC: 292 U/L (ref 39–117)
ALT SERPL W P-5'-P-CCNC: 11 U/L (ref 1–41)
ALT SERPL W P-5'-P-CCNC: 13 U/L (ref 1–41)
AMYLASE SERPL-CCNC: 292 U/L (ref 28–100)
ANION GAP SERPL CALCULATED.3IONS-SCNC: 22 MMOL/L (ref 5–15)
AST SERPL-CCNC: 24 U/L (ref 1–40)
AST SERPL-CCNC: 25 U/L (ref 1–40)
BASOPHILS # BLD AUTO: 0.02 10*3/MM3 (ref 0–0.2)
BASOPHILS NFR BLD AUTO: 0.2 % (ref 0–1.5)
BILIRUB CONJ SERPL-MCNC: 0.1 MG/DL (ref 0–0.3)
BILIRUB INDIRECT SERPL-MCNC: 0.2 MG/DL
BILIRUB SERPL-MCNC: 0.2 MG/DL (ref 0–1.2)
BILIRUB SERPL-MCNC: 0.3 MG/DL (ref 0–1.2)
BILIRUB UR QL STRIP: NEGATIVE
BUN SERPL-MCNC: 121 MG/DL (ref 8–23)
BUN/CREAT SERPL: 26 (ref 7–25)
CALCIUM SPEC-SCNC: 8.4 MG/DL (ref 8.6–10.5)
CHLORIDE SERPL-SCNC: 98 MMOL/L (ref 98–107)
CLARITY UR: CLEAR
CO2 SERPL-SCNC: 16 MMOL/L (ref 22–29)
COLOR UR: YELLOW
CREAT SERPL-MCNC: 4.66 MG/DL (ref 0.76–1.27)
CREAT UR-MCNC: 56.1 MG/DL
CREAT UR-MCNC: 58.8 MG/DL
DEPRECATED RDW RBC AUTO: 50.2 FL (ref 37–54)
EGFRCR SERPLBLD CKD-EPI 2021: 11.5 ML/MIN/1.73
EOSINOPHIL # BLD AUTO: 0.2 10*3/MM3 (ref 0–0.4)
EOSINOPHIL NFR BLD AUTO: 2 % (ref 0.3–6.2)
ERYTHROCYTE [DISTWIDTH] IN BLOOD BY AUTOMATED COUNT: 14.4 % (ref 12.3–15.4)
GLOBULIN UR ELPH-MCNC: 5.1 GM/DL
GLUCOSE SERPL-MCNC: 102 MG/DL (ref 65–99)
GLUCOSE UR STRIP-MCNC: NEGATIVE MG/DL
HCT VFR BLD AUTO: 28.2 % (ref 37.5–51)
HGB BLD-MCNC: 9 G/DL (ref 13–17.7)
HGB UR QL STRIP.AUTO: NEGATIVE
IMM GRANULOCYTES # BLD AUTO: 0.03 10*3/MM3 (ref 0–0.05)
IMM GRANULOCYTES NFR BLD AUTO: 0.3 % (ref 0–0.5)
KETONES UR QL STRIP: NEGATIVE
LEUKOCYTE ESTERASE UR QL STRIP.AUTO: NEGATIVE
LIPASE SERPL-CCNC: 598 U/L (ref 13–60)
LYMPHOCYTES # BLD AUTO: 1.06 10*3/MM3 (ref 0.7–3.1)
LYMPHOCYTES NFR BLD AUTO: 10.8 % (ref 19.6–45.3)
MCH RBC QN AUTO: 29.9 PG (ref 26.6–33)
MCHC RBC AUTO-ENTMCNC: 31.9 G/DL (ref 31.5–35.7)
MCV RBC AUTO: 93.7 FL (ref 79–97)
MICROALBUMIN/CREAT UR: 37.4 MG/G (ref 0–29)
MONOCYTES # BLD AUTO: 0.85 10*3/MM3 (ref 0.1–0.9)
MONOCYTES NFR BLD AUTO: 8.7 % (ref 5–12)
NEUTROPHILS NFR BLD AUTO: 7.64 10*3/MM3 (ref 1.7–7)
NEUTROPHILS NFR BLD AUTO: 78 % (ref 42.7–76)
NITRITE UR QL STRIP: NEGATIVE
PH UR STRIP.AUTO: 5.5 [PH] (ref 5–8)
PLATELET # BLD AUTO: 229 10*3/MM3 (ref 140–450)
PMV BLD AUTO: 8.8 FL (ref 6–12)
POTASSIUM SERPL-SCNC: 4.5 MMOL/L (ref 3.5–5.2)
PROT ?TM UR-MCNC: 12.6 MG/DL
PROT SERPL-MCNC: 8.5 G/DL (ref 6–8.5)
PROT SERPL-MCNC: 8.9 G/DL (ref 6–8.5)
PROT UR QL STRIP: NEGATIVE
PROT/CREAT UR: 0.21 MG/G{CREAT}
RBC # BLD AUTO: 3.01 10*6/MM3 (ref 4.14–5.8)
SODIUM SERPL-SCNC: 136 MMOL/L (ref 136–145)
SP GR UR STRIP: 1.02 (ref 1–1.03)
UROBILINOGEN UR QL STRIP: NORMAL
WBC NRBC COR # BLD AUTO: 9.8 10*3/MM3 (ref 3.4–10.8)

## 2025-07-17 PROCEDURE — 82043 UR ALBUMIN QUANTITATIVE: CPT

## 2025-07-17 PROCEDURE — 81003 URINALYSIS AUTO W/O SCOPE: CPT

## 2025-07-17 PROCEDURE — 1125F AMNT PAIN NOTED PAIN PRSNT: CPT | Performed by: NURSE PRACTITIONER

## 2025-07-17 PROCEDURE — 85025 COMPLETE CBC W/AUTO DIFF WBC: CPT

## 2025-07-17 PROCEDURE — 80053 COMPREHEN METABOLIC PANEL: CPT

## 2025-07-17 PROCEDURE — 1160F RVW MEDS BY RX/DR IN RCRD: CPT | Performed by: NURSE PRACTITIONER

## 2025-07-17 PROCEDURE — 36415 COLL VENOUS BLD VENIPUNCTURE: CPT

## 2025-07-17 PROCEDURE — 82570 ASSAY OF URINE CREATININE: CPT

## 2025-07-17 PROCEDURE — 84156 ASSAY OF PROTEIN URINE: CPT

## 2025-07-17 PROCEDURE — 99213 OFFICE O/P EST LOW 20 MIN: CPT | Performed by: NURSE PRACTITIONER

## 2025-07-17 PROCEDURE — 1159F MED LIST DOCD IN RCRD: CPT | Performed by: NURSE PRACTITIONER

## 2025-07-17 NOTE — PROGRESS NOTES
"Chief Complaint  Abdominal Pain (Patient states this has been going on for 1.5 weeks ) and Diarrhea    Subjective          Eze Harvey Jauregui presents to [facility]     Patient is a 89-year-old male who is here today with his daughter Laurence.  He has recently started seeing nephrology for impaired kidney function.  Next appoint with nephrology is next week.  2 days ago he had a kidney function panel which shows his creatinine is worsening to 3.99 with a GFR of 13.8.  He needs to have additional labs collected before his follow-up with nephrology next week.  Laurence has those labs from Dr. De Souza with her today.  Over the last week and a half he reports he is having diarrhea with varying frequency but stool is always loose or watery.  He has noted a streak of blood in it on 1 occasion.  He is continuing to have diminished fluid intake and he has had about 7 pounds weight loss in the last couple weeks.  Regarding frequencies of loose or watery stools he reports he has had 3 episodes this morning.  He denies nausea, vomiting, abdominal pain, difficulty with swallowing, heartburn, or any other symptoms.  He does take omeprazole 40 mg/day.  He does report a diminished appetite.  His daughters been try to get him to drink liquid IV with limited success.  They are concerned he may be becoming dehydrated but hesitate to go to the ER for IV fluids at this time.     Objective   Vital Signs:   Vitals:    07/17/25 1006   BP: 116/66   BP Location: Left arm   Patient Position: Sitting   Cuff Size: Adult   Pulse: 71   Temp: 97.6 °F (36.4 °C)   TempSrc: Oral   SpO2: 98%   Weight: 53.1 kg (117 lb)   Height: 170.2 cm (67\")       Wt Readings from Last 3 Encounters:   07/17/25 53.1 kg (117 lb)   04/22/25 56.2 kg (124 lb)   03/25/25 56.4 kg (124 lb 6.4 oz)      BP Readings from Last 3 Encounters:   07/17/25 116/66   05/07/25 126/54   04/22/25 (!) 193/92       Body mass index is 18.32 kg/m².       " "    Physical Exam  Vitals reviewed.   Constitutional:       General: He is not in acute distress.     Appearance: Normal appearance. He is well-developed.   HENT:      Right Ear: Tympanic membrane normal.      Left Ear: Tympanic membrane normal.      Mouth/Throat:      Lips: No lesions.      Mouth: Mucous membranes are moist.      Tongue: No lesions.      Pharynx: Oropharynx is clear. No oropharyngeal exudate or posterior oropharyngeal erythema.   Cardiovascular:      Rate and Rhythm: Normal rate and regular rhythm.      Pulses:           Posterior tibial pulses are 2+ on the right side and 2+ on the left side.      Heart sounds: Normal heart sounds.   Pulmonary:      Effort: Pulmonary effort is normal.      Breath sounds: Normal breath sounds.   Musculoskeletal:      Right lower leg: No edema.      Left lower leg: No edema.   Skin:     General: Skin is warm and dry.   Neurological:      General: No focal deficit present.      Mental Status: He is alert.   Psychiatric:         Attention and Perception: Attention normal.         Mood and Affect: Mood and affect normal.         Behavior: Behavior normal.           Current Outpatient Medications:     levothyroxine (Synthroid) 75 MCG tablet, Take 1 tablet by mouth Every Morning., Disp: 90 tablet, Rfl: 1    lisinopril (PRINIVIL,ZESTRIL) 40 MG tablet, Take 1 tablet by mouth Daily., Disp: 30 tablet, Rfl: 1    Magnesium Oxide -Mg Supplement 400 (240 Mg) MG tablet, Take 1 tablet by mouth Every Night., Disp: , Rfl:     omeprazole (priLOSEC) 40 MG capsule, Take 1 capsule by mouth Daily. Indications: Gastroesophageal Reflux Disease, Disp: , Rfl:     sertraline (Zoloft) 25 MG tablet, Take 1 tablet by mouth Daily., Disp: 90 tablet, Rfl: 1    Silver (Mepilex AG) 4\"X4\" pads, Apply 1 each topically Daily. Apply to pressure ulcer as needed, Disp: 30 each, Rfl: 0    silver sulfadiazine (Silvadene) 1 % cream, Apply 1 Application topically to the appropriate area as directed 2 (Two) " "Times a Day., Disp: 50 g, Rfl: 0    tamsulosin (FLOMAX) 0.4 MG capsule 24 hr capsule, Take 1 capsule by mouth Daily., Disp: 90 capsule, Rfl: 1    furosemide (Lasix) 40 MG tablet, Take 1 tablet by mouth 2 (Two) Times a Day for 3 days., Disp: 6 tablet, Rfl: 0   History reviewed. No pertinent past medical history.  No Known Allergies            Result Review :     Common labs          5/20/2025    12:40 5/28/2025    11:48 7/15/2025    09:04   Common Labs   Glucose 117  111  129    BUN 39  41.0  105.0    Creatinine 2.23  2.24  3.99    Sodium 137  138  134    Potassium 5.8  6.0  4.9    Chloride 107  108  98    Calcium 8.0  8.6  9.2    Albumin 3.1  3.4  3.7    Total Bilirubin 0.2  0.3     Alkaline Phosphatase 321  455     AST (SGOT) 40  60     ALT (SGPT) 18  33     WBC 6.90  8.09     Hemoglobin 8.7  9.3     Hematocrit 28.3  30.8     Platelets 290  258          XR Hip With or Without Pelvis 2 - 3 View Right  Result Date: 4/24/2025  Surgical changes of total right hip arthroplasty without hardware complication. Images reviewed, interpreted, and dictated by Dr. FRANK Garcia. Transcribed by Barbara Eric PA-C.    XR Hip With or Without Pelvis 2 - 3 View Right  Result Date: 4/24/2025  Total right hip arthroplasty. Please see the operative report. Images reviewed, interpreted, and dictated by Dr. Alec Tomlin. Transcribed by Cheryl Wallace PA-C.    XR Chest 1 View  Result Date: 4/23/2025  Chronic changes with no acute process. Images reviewed, interpreted, dictated and electronically signed by Zoran Tanner MD Voice transcription technology (Power Scribe) is used for the dictation of this note and \"sound-alike\" words might be erroneously placed despite reviewing this note for accuracy. Errors in dictation may reflect use of voice recognition software and not all errors in transcription may have been detected prior to signing.    XR femur right  Result Date: 4/23/2025  Angulated right femoral neck fracture. RIGHT FEMUR 2 " "VIEWS HISTORY: Fall, hip deformity FINDINGS: 2 views of the right femur demonstrate a right femoral neck fracture. The distal femur is unremarkable. IMPRESSION: Right femoral neck fracture. Images reviewed, interpreted, dictated and electronically signed by Zoran Tanner MD Voice transcription technology (Power Scribe) is used for the dictation of this note and \"sound-alike\" words might be erroneously placed despite reviewing this note for accuracy. Errors in dictation may reflect use of voice recognition software and not all errors in transcription may have been detected prior to signing.    XR Hip With or Without Pelvis 2 - 3 View Right  Result Date: 4/23/2025  Angulated right femoral neck fracture. RIGHT FEMUR 2 VIEWS HISTORY: Fall, hip deformity FINDINGS: 2 views of the right femur demonstrate a right femoral neck fracture. The distal femur is unremarkable. IMPRESSION: Right femoral neck fracture. Images reviewed, interpreted, dictated and electronically signed by Zoran Tanner MD Voice transcription technology (Power Scribe) is used for the dictation of this note and \"sound-alike\" words might be erroneously placed despite reviewing this note for accuracy. Errors in dictation may reflect use of voice recognition software and not all errors in transcription may have been detected prior to signing.    CT cervical spine without contrast  Result Date: 4/22/2025  1. Right parietal scalp soft tissue swelling without fracture. 2. Moderate atrophy and microvascular change. 3. No acute intracranial abnormalities. CT C-SPINE. HISTORY: Status post fall with neck pain. COMPARISON:  None . PROCEDURE: Thin section axial images were obtained from the skull base to the thoracic inlet without contrast. Coronal and sagittal reformatted images were performed and reviewed. This study was performed with techniques to keep radiation doses as low as reasonably achievable, (ALARA). Individualized dose reduction techniques using automated " "exposure control or adjustment of mA and/or kV according to the patient size were employed. FINDINGS: There is rightward curvature of the cervical spine. The facets overlap at all levels. There is mild grade 1 anterior subluxation of C6 on C7. There is no fracture. The vertebral bodies are of normal height. There is mild disc space narrowing at C3-C4. There is no evidence of a fracture. Carotid vascular calcifications are noted in the soft tissues. The prevertebral soft tissues and paraspinal soft tissues are unremarkable. There is multilevel bony foraminal stenosis. IMPRESSION: Degenerative changes with no acute bony abnormality. Images reviewed, interpreted, dictated and electronically signed by Zoran Tanner MD Voice transcription technology (Power Accelereache) is used for the dictation of this note and \"sound-alike\" words might be erroneously placed despite reviewing this note for accuracy. Errors in dictation may reflect use of voice recognition software and not all errors in transcription may have been detected prior to signing.    CT Head Without Contrast  Result Date: 4/22/2025  1. Right parietal scalp soft tissue swelling without fracture. 2. Moderate atrophy and microvascular change. 3. No acute intracranial abnormalities. CT C-SPINE. HISTORY: Status post fall with neck pain. COMPARISON:  None . PROCEDURE: Thin section axial images were obtained from the skull base to the thoracic inlet without contrast. Coronal and sagittal reformatted images were performed and reviewed. This study was performed with techniques to keep radiation doses as low as reasonably achievable, (ALARA). Individualized dose reduction techniques using automated exposure control or adjustment of mA and/or kV according to the patient size were employed. FINDINGS: There is rightward curvature of the cervical spine. The facets overlap at all levels. There is mild grade 1 anterior subluxation of C6 on C7. There is no fracture. The vertebral " "bodies are of normal height. There is mild disc space narrowing at C3-C4. There is no evidence of a fracture. Carotid vascular calcifications are noted in the soft tissues. The prevertebral soft tissues and paraspinal soft tissues are unremarkable. There is multilevel bony foraminal stenosis. IMPRESSION: Degenerative changes with no acute bony abnormality. Images reviewed, interpreted, dictated and electronically signed by Zoran Tanner MD Voice transcription technology (Power ND Acquisitionsibe) is used for the dictation of this note and \"sound-alike\" words might be erroneously placed despite reviewing this note for accuracy. Errors in dictation may reflect use of voice recognition software and not all errors in transcription may have been detected prior to signing.    US Venous Doppler Lower Extremity Right (duplex)  Result Date: 3/25/2025  Impression: No evidence of right lower extremity DVT. Electronically Signed: Bentley Arnold MD  3/25/2025 1:24 PM EDT  Workstation ID: [id number]             Social History     Tobacco Use   Smoking Status Never   Smokeless Tobacco Never           Assessment and Plan    Diagnoses and all orders for this visit:    1. Diarrhea, unspecified type (Primary)  Assessment & Plan:  Stool tests are ordered.  Unfortunately we cannot administer IV fluids.  His kidney function is more diminished and is hard to say it is entirely due to dehydration but is very likely.  His mucous membranes and his oral mucosa are moist but he has slight tenting of his skin on his hand.  Encouraged patient to drink more fluids.  He denies any mechanical difficulty with eating or drinking and denies nausea and defers a refill of Zofran.  I am concerned for his weight loss and decreased desire to drink fluids.  Renal function panel was done 2 days ago.  Our lab is able to add hepatic function panel from blood drawn 2 days ago to satisfy the CMP needed by nephrology next week.  Unfortunately a CBC cannot be drawn from blood " already drawn and may need to attempt a get that done today or tomorrow.  Amylase and lipase are being ordered by me today and may be done by blood already drawn.  Urine tests are being completed per nephrology.  If he were to have a elevated white blood cell count or any notable blood in the urine CT of the abdomen would be indicated but would have to be done without contrast due to his current kidney function.  I also offer an abdominal ultrasound to look for any other underlying cause but patient defers at this time.  Patient is provided with a call bottled water to drink and we will obtain further testing.  Further treatment recommendations will pend those results.  Ultimately if concern for dehydration will need to go to the emergency room for IV fluids.    Orders:  -     Hepatic Function Panel; Future  -     Enteric Bacterial Panel - Stool, Per Rectum; Future  -     Clostridioides difficile Toxin, PCR - Stool, Per Rectum  -     Amylase; Future  -     Lipase; Future    2. Weight loss, non-intentional        Follow Up    No follow-ups on file.  Patient was given instructions and counseling regarding his condition or for health maintenance advice. Please see specific information pulled into the AVS if appropriate.

## 2025-07-17 NOTE — ASSESSMENT & PLAN NOTE
Stool tests are ordered.  Unfortunately we cannot administer IV fluids.  His kidney function is more diminished and is hard to say it is entirely due to dehydration but is very likely.  His mucous membranes and his oral mucosa are moist but he has slight tenting of his skin on his hand.  Encouraged patient to drink more fluids.  He denies any mechanical difficulty with eating or drinking and denies nausea and defers a refill of Zofran.  I am concerned for his weight loss and decreased desire to drink fluids.  Renal function panel was done 2 days ago.  Our lab is able to add hepatic function panel from blood drawn 2 days ago to satisfy the CMP needed by nephrology next week.  Unfortunately a CBC cannot be drawn from blood already drawn and may need to attempt a get that done today or tomorrow.  Amylase and lipase are being ordered by me today and may be done by blood already drawn.  Urine tests are being completed per nephrology.  If he were to have a elevated white blood cell count or any notable blood in the urine CT of the abdomen would be indicated but would have to be done without contrast due to his current kidney function.  I also offer an abdominal ultrasound to look for any other underlying cause but patient defers at this time.  Patient is provided with a call bottled water to drink and we will obtain further testing.  Further treatment recommendations will pend those results.  Ultimately if concern for dehydration will need to go to the emergency room for IV fluids.

## 2025-07-21 ENCOUNTER — READMISSION MANAGEMENT (OUTPATIENT)
Dept: CALL CENTER | Facility: HOSPITAL | Age: 89
End: 2025-07-21
Payer: MEDICARE

## 2025-07-21 ENCOUNTER — TRANSITIONAL CARE MANAGEMENT TELEPHONE ENCOUNTER (OUTPATIENT)
Dept: CALL CENTER | Facility: HOSPITAL | Age: 89
End: 2025-07-21
Payer: MEDICARE

## 2025-07-21 NOTE — OUTREACH NOTE
Call Center TCM Note      Flowsheet Row Responses   [facility] patient discharged from? Non-   Does the patient have one of the following disease processes/diagnoses(primary or secondary)? Other   TCM attempt successful? Yes  [vr for piotr Dang]   Call start time 1125   Call end time 1133   Discharge diagnosis Urinary retention   Person spoke with today (if not patient) and relationship piotr Dang   Meds reviewed with patient/caregiver? Yes   Does the patient have all medications ordered at discharge? N/A   Prescription comments numerous medications were discontinued at discharge   Is the patient taking all medications as directed (includes completed medication regime)? Yes   Does the patient have an appointment with their PCP within 7-14 days of discharge? No   Nursing Interventions Routed TCM call to PCP office   What is the Home health agency?  Hospice to visit today for admission 7/21/25   Psychosocial issues? No   Comments Pt discharged with manzo cath,  piotr received education about manzo cath care and has no concerns at time of call   Did the patient receive a copy of their discharge instructions? Yes   Nursing interventions Reviewed instructions with patient   What is the patient's perception of their health status since discharge? Same  [Pt is doing ok--adequate urine output, yellow in color.  Ambulatory and was able to prepare his breakfast this am  Pt received a hospice referral while admitted and deemed appropriate, plans for admission today.]   Is the patient/caregiver able to teach back signs and symptoms related to disease process for when to call PCP? Yes  [pt will be following with hospice]   TCM call completed? Yes   Call end time 1133            SHANELL Davies Registered Nurse    7/21/2025, 11:33 EDT

## 2025-07-21 NOTE — OUTREACH NOTE
Prep Survey      Flowsheet Row Responses   [facility] facility patient discharged from? Non-BH   Is LACE score < 7 ? Non-BH Discharge   Eligibility [facility]   Date of Admission 07/17/25   Date of Discharge 07/19/25   Discharge Disposition Home or Self Care   Discharge diagnosis Urinary retention   Does the patient have one of the following disease processes/diagnoses(primary or secondary)? Other   Prep survey completed? Yes            Starr Davies Registered Nurse

## 2025-07-22 ENCOUNTER — TELEPHONE (OUTPATIENT)
Dept: FAMILY MEDICINE CLINIC | Age: 89
End: 2025-07-22
Payer: MEDICARE

## 2025-07-22 DIAGNOSIS — C90.00 MULTIPLE MYELOMA WITH FAILED REMISSION: Primary | ICD-10-CM

## 2025-07-22 DIAGNOSIS — N18.4 STAGE 4 CHRONIC KIDNEY DISEASE: ICD-10-CM

## 2025-07-22 DIAGNOSIS — K62.89 RECTAL MASS: ICD-10-CM

## 2025-07-22 RX ORDER — MORPHINE SULFATE 100 MG/5ML
5 SOLUTION ORAL EVERY 4 HOURS PRN
Qty: 30 ML | Refills: 0 | Status: SHIPPED | OUTPATIENT
Start: 2025-07-22

## 2025-07-22 RX ORDER — LORAZEPAM 2 MG/ML
1 CONCENTRATE ORAL EVERY 4 HOURS PRN
Qty: 30 ML | Refills: 0 | Status: SHIPPED | OUTPATIENT
Start: 2025-07-22

## 2025-07-22 NOTE — TELEPHONE ENCOUNTER
Patient admitted to hospice from [facility]  Renal failure  Multiple myeloma  Rectal mass    mas

## 2025-08-01 ENCOUNTER — TELEPHONE (OUTPATIENT)
Dept: FAMILY MEDICINE CLINIC | Age: 89
End: 2025-08-01
Payer: MEDICARE

## 2025-08-19 ENCOUNTER — TELEPHONE (OUTPATIENT)
Dept: FAMILY MEDICINE CLINIC | Age: 89
End: 2025-08-19
Payer: MEDICARE

## 2025-08-19 DIAGNOSIS — C90.00 MULTIPLE MYELOMA WITH FAILED REMISSION: Primary | ICD-10-CM

## 2025-08-19 RX ORDER — METHYLPREDNISOLONE 4 MG/1
TABLET ORAL
Qty: 1 EACH | Refills: 0 | Status: SHIPPED | OUTPATIENT
Start: 2025-08-19

## 2025-08-19 RX ORDER — DULOXETIN HYDROCHLORIDE 30 MG/1
30 CAPSULE, DELAYED RELEASE ORAL DAILY
Qty: 14 CAPSULE | Refills: 1 | Status: SHIPPED | OUTPATIENT
Start: 2025-08-19